# Patient Record
Sex: FEMALE | Race: WHITE | NOT HISPANIC OR LATINO | Employment: OTHER | URBAN - METROPOLITAN AREA
[De-identification: names, ages, dates, MRNs, and addresses within clinical notes are randomized per-mention and may not be internally consistent; named-entity substitution may affect disease eponyms.]

---

## 2017-04-24 ENCOUNTER — HISTORICAL (OUTPATIENT)
Dept: ADMINISTRATIVE | Facility: HOSPITAL | Age: 67
End: 2017-04-24

## 2019-09-12 ENCOUNTER — OFFICE VISIT (OUTPATIENT)
Dept: FAMILY MEDICINE | Facility: CLINIC | Age: 69
End: 2019-09-12
Payer: MEDICARE

## 2019-09-12 VITALS
BODY MASS INDEX: 26 KG/M2 | HEIGHT: 59 IN | WEIGHT: 129 LBS | HEART RATE: 64 BPM | DIASTOLIC BLOOD PRESSURE: 80 MMHG | SYSTOLIC BLOOD PRESSURE: 152 MMHG

## 2019-09-12 DIAGNOSIS — M71.22 SYNOVIAL CYST OF LEFT POPLITEAL SPACE: ICD-10-CM

## 2019-09-12 DIAGNOSIS — I10 ESSENTIAL HYPERTENSION: Primary | ICD-10-CM

## 2019-09-12 PROCEDURE — 99214 PR OFFICE/OUTPT VISIT, EST, LEVL IV, 30-39 MIN: ICD-10-PCS | Mod: S$GLB,,, | Performed by: PHYSICIAN ASSISTANT

## 2019-09-12 PROCEDURE — 99214 OFFICE O/P EST MOD 30 MIN: CPT | Mod: S$GLB,,, | Performed by: PHYSICIAN ASSISTANT

## 2019-09-12 RX ORDER — LISINOPRIL 20 MG/1
TABLET ORAL
COMMUNITY
End: 2019-09-12 | Stop reason: SDUPTHER

## 2019-09-12 RX ORDER — LISINOPRIL 20 MG/1
20 TABLET ORAL DAILY
Qty: 90 TABLET | Refills: 1 | Status: SHIPPED | OUTPATIENT
Start: 2019-09-12 | End: 2020-03-12 | Stop reason: SDUPTHER

## 2019-09-12 NOTE — PATIENT INSTRUCTIONS
Treatment for Bakers Cyst (Popliteal Cyst)  A Bakers cyst (popliteal cyst) is a fluid-filled sac that forms behind the knee.  Types of treatment  You likely wont need any treatment if you dont have any symptoms from your Bakers cyst. Some Bakers cysts go away without any treatment. If your cyst starts causing symptoms, you might need treatment at that time.  If you do have symptoms, you may be treated depending on the cause of your cyst. For example, you may need medicine for rheumatoid arthritis. Or you may need physical therapy for osteoarthritis.  Other treatments for a Bakers cyst can include:  · Over-the-counter pain medicines  · Arthrocentesis to remove extra fluid from the joint space  · Steroid injection into the joint to reduce cyst size  · Surgery to remove the cyst  Possible complications of a Bakers cyst  In rare cases, a Bakers cyst may cause complications. The cyst may get larger, which may cause redness and swelling. The cyst may also rupture, causing warmth, redness, and pain in your calf.  The symptoms may be the same as a blood clot in the veins of the legs. Your health care provider may need imaging tests of your leg to make sure you dont have a clot. Rupture can also lead to its own complications, such as:  · Trapping of a tibial nerve. This cases calf pain and numbness behind the leg. It can be treated with arthrocentesis and steroid injections.  · Blockage of the popliteal artery. This causes pain and lack of blood flow to the leg. It can also be treated with arthrocentesis and steroid injections.  · Compartment syndrome. This causes intense pain and problems moving the foot or toes. Compartment syndrome is a medical emergency. It needs immediate surgery. It can lead to permanent muscle damage if not treated right away.  When to call the health care provider  If your cyst starts causing mild symptoms, plan to see your health care provider soon. See him or her right away if you have  symptoms such as redness and swelling of your leg. These symptoms may mean your Bakers cyst has ruptured.      Date Last Reviewed: 7/21/2015  © 7819-7394 [employer]. [address] [place] [address], [place] [address]. All rights reserved. This information is not intended as a substitute for professional medical care. Always follow your healthcare professional's instructions.         Detail Level: Simple Comment: S/p ED&C

## 2019-09-12 NOTE — PROGRESS NOTES
SUBJECTIVE:    Patient ID: Lina Ventura is a 68 y.o. female.    Chief Complaint: Annual Exam    69 yo wf presents for regular checkup. She reports that she has continued to do pretty well. Only treated for the HTN.. She reports that her legs have gotten to be troublesome for her. Thighs, knees, joints. Does have problems with baker's cyst on the LT knee. Was bigger a few weeks back after being on her feet a great deal.      No visits with results within 6 Month(s) from this visit.   Latest known visit with results is:   No results found for any previous visit.       Past Medical History:   Diagnosis Date    Cholelithiasis      Past Surgical History:   Procedure Laterality Date    CHOLECYSTECTOMY  04/24/2017    Laparoscopic      Family History   Problem Relation Age of Onset    Breast cancer Maternal Aunt         In her 30's       Marital Status: Single  Alcohol History:  reports that she does not drink alcohol.  Tobacco History:  reports that she has never smoked. She has never used smokeless tobacco.  Drug History:  reports that she does not use drugs.    Review of patient's allergies indicates:   Allergen Reactions    Bactrim [sulfamethoxazole-trimethoprim]        Current Outpatient Medications:     cholecalciferol, vitamin D3, 4,000 unit Cap, Vitamin D3 4,000 unit capsule  Take 1 capsule every day by oral route., Disp: , Rfl:     lisinopril (PRINIVIL,ZESTRIL) 20 MG tablet, Take 1 tablet (20 mg total) by mouth once daily., Disp: 90 tablet, Rfl: 1    Review of Systems   Constitutional: Negative for appetite change, chills, fatigue, fever and unexpected weight change.   HENT: Negative for congestion.    Respiratory: Negative for cough, chest tightness and shortness of breath.    Cardiovascular: Negative for chest pain and palpitations.   Gastrointestinal: Negative for abdominal distention and abdominal pain.   Endocrine: Negative for cold intolerance and heat intolerance.   Genitourinary: Negative for  "difficulty urinating and dysuria.   Musculoskeletal: Negative for arthralgias and back pain.   Neurological: Negative for dizziness, weakness and headaches.          Objective:      Vitals:    09/12/19 1008   BP: (!) 152/80   Pulse: 64   Weight: 58.5 kg (129 lb)   Height: 4' 11" (1.499 m)     Physical Exam   Constitutional: She is oriented to person, place, and time. She appears well-developed and well-nourished. No distress.   HENT:   Head: Normocephalic and atraumatic.   Eyes: Pupils are equal, round, and reactive to light. Conjunctivae and EOM are normal.   Neck: Normal range of motion. Neck supple. No thyromegaly present.   Cardiovascular: Normal rate, regular rhythm, normal heart sounds and intact distal pulses.   Pulmonary/Chest: Effort normal and breath sounds normal.   Abdominal: Soft. Bowel sounds are normal. She exhibits no distension. There is no tenderness.   Musculoskeletal: Normal range of motion.        Left lower leg: She exhibits swelling.   Neurological: She is alert and oriented to person, place, and time. No cranial nerve deficit.   Skin: Skin is warm and dry. No erythema.   Psychiatric: She has a normal mood and affect.         Assessment:       1. Essential hypertension    2. Synovial cyst of left popliteal space         Plan:       Essential hypertension  Comments:  well managed. continue as is  Orders:  -     lisinopril (PRINIVIL,ZESTRIL) 20 MG tablet; Take 1 tablet (20 mg total) by mouth once daily.  Dispense: 90 tablet; Refill: 1    Synovial cyst of left popliteal space  Comments:  I do appreciated pretty good baker cyst. Will refer to Dr. Gonzalez since it is bothering her.  Orders:  -     Ambulatory referral/consult to Orthopedics; Future; Expected date: 09/12/2019      Follow up in about 6 months (around 3/12/2020), or if symptoms worsen or fail to improve.        9/12/2019 Tylor Brewer PA-C    "

## 2019-10-08 ENCOUNTER — OFFICE VISIT (OUTPATIENT)
Dept: ORTHOPEDICS | Facility: CLINIC | Age: 69
End: 2019-10-08
Payer: MEDICARE

## 2019-10-08 VITALS
DIASTOLIC BLOOD PRESSURE: 80 MMHG | WEIGHT: 129 LBS | BODY MASS INDEX: 26 KG/M2 | SYSTOLIC BLOOD PRESSURE: 138 MMHG | HEART RATE: 64 BPM | HEIGHT: 59 IN

## 2019-10-08 DIAGNOSIS — M17.11 PATELLOFEMORAL ARTHRITIS OF RIGHT KNEE: ICD-10-CM

## 2019-10-08 DIAGNOSIS — M71.22 SYNOVIAL CYST OF LEFT POPLITEAL SPACE: ICD-10-CM

## 2019-10-08 DIAGNOSIS — M17.12 PATELLOFEMORAL ARTHRITIS OF LEFT KNEE: Primary | ICD-10-CM

## 2019-10-08 PROCEDURE — 99203 PR OFFICE/OUTPT VISIT, NEW, LEVL III, 30-44 MIN: ICD-10-PCS | Mod: 25,S$GLB,, | Performed by: ORTHOPAEDIC SURGERY

## 2019-10-08 PROCEDURE — 20610 LARGE JOINT ASPIRATION/INJECTION: R KNEE: ICD-10-PCS | Mod: 50,S$GLB,, | Performed by: ORTHOPAEDIC SURGERY

## 2019-10-08 PROCEDURE — 20610 DRAIN/INJ JOINT/BURSA W/O US: CPT | Mod: 50,S$GLB,, | Performed by: ORTHOPAEDIC SURGERY

## 2019-10-08 PROCEDURE — 99203 OFFICE O/P NEW LOW 30 MIN: CPT | Mod: 25,S$GLB,, | Performed by: ORTHOPAEDIC SURGERY

## 2019-10-08 RX ORDER — METHYLPREDNISOLONE ACETATE 40 MG/ML
40 INJECTION, SUSPENSION INTRA-ARTICULAR; INTRALESIONAL; INTRAMUSCULAR; SOFT TISSUE
Status: DISCONTINUED | OUTPATIENT
Start: 2019-10-08 | End: 2019-10-08 | Stop reason: HOSPADM

## 2019-10-08 RX ADMIN — METHYLPREDNISOLONE ACETATE 40 MG: 40 INJECTION, SUSPENSION INTRA-ARTICULAR; INTRALESIONAL; INTRAMUSCULAR; SOFT TISSUE at 09:10

## 2019-10-08 NOTE — LETTER
October 8, 2019      Tylor Brewer PA-C  [address]  [address]  [facility] [address]           [facility]  [address]  [facility] [address]  Phone: [phone]  Fax: [phone]          Patient: Lina Ventura   MR Number: [id number]   YOB: 1950   Date of Visit: 10/8/2019       Dear Tylor Brewer:    Thank you for referring Lina Ventura to me for evaluation. Attached you will find relevant portions of my assessment and plan of care.    If you have questions, please do not hesitate to call me. I look forward to following Lina Ventura along with you.    Sincerely,    Mustapha Gonzalez MD    Enclosure  CC:  No Recipients    If you would like to receive this communication electronically, please contact [email or web address] or [phone] to request more information on [employer] access.    For providers and/or their staff who would like to refer a patient to [facility], please contact us through our one-stop-shop provider referral line, [facility] at [phone].    If you feel you have received this communication in error or would no longer like to receive these types of communications, please e-mail [email or web address]

## 2019-10-08 NOTE — PROCEDURES
Large Joint Aspiration/Injection: R knee  Date/Time: 10/8/2019 9:00 AM  Performed by: Mustapha Gonzalez MD  Authorized by: Mustapha Gonzalez MD     Consent Done?:  Yes (Verbal)  Indications:  Pain  Procedure site marked: Yes    Timeout: Prior to procedure the correct patient, procedure, and site was verified    Anesthesia  Local anesthesia used  Anesthesia: local infiltration  Anesthetic: lidocaine 1% without epinephrine    Location:  Knee  Site:  R knee  Prep: Patient was prepped and draped in usual sterile fashion    Needle size:  25 G  Medications:  40 mg methylPREDNISolone acetate 40 mg/mL; 40 mg methylPREDNISolone acetate 40 mg/mL  Patient tolerance:  Patient tolerated the procedure well with no immediate complications  Large Joint Aspiration/Injection: L knee  Date/Time: 10/8/2019 9:00 AM  Performed by: Mustapha Gonzalez MD  Authorized by: Mustapha Gonzalez MD     Consent Done?:  Yes (Verbal)  Indications:  Pain  Procedure site marked: Yes    Timeout: Prior to procedure the correct patient, procedure, and site was verified    Anesthesia  Local anesthesia used  Anesthesia: local infiltration  Anesthetic: lidocaine 1% without epinephrine    Location:  Knee  Site:  L knee  Prep: Patient was prepped and draped in usual sterile fashion    Needle size:  25 G  Medications:  40 mg methylPREDNISolone acetate 40 mg/mL; 40 mg methylPREDNISolone acetate 40 mg/mL  Patient tolerance:  Patient tolerated the procedure well with no immediate complications

## 2019-10-08 NOTE — PROGRESS NOTES
[facility]    Subjective:     Chief Complaint:   Chief Complaint   Patient presents with    Left Knee - Pain     Left knee pain x a while. States that the pain has gotten worse since last December. States that she did have issues with bearing weight, that got better some. Pain is constant and gets worse with activity.     Right Knee - Pain     Right knee pain x a while. States that her knee pain had gotten worse in December. States that she did have issues with bearing weight, but that is better. She does have constant pain that gets worse with activity.        Past Medical History:   Diagnosis Date    Cholelithiasis        Past Surgical History:   Procedure Laterality Date    CHOLECYSTECTOMY  04/24/2017    Laparoscopic        Current Outpatient Medications   Medication Sig    cholecalciferol, vitamin D3, 4,000 unit Cap Vitamin D3 4,000 unit capsule   Take 1 capsule every day by oral route.    lisinopril (PRINIVIL,ZESTRIL) 20 MG tablet Take 1 tablet (20 mg total) by mouth once daily.    potassium 99 mg Tab Take by mouth once.     No current facility-administered medications for this visit.        Review of patient's allergies indicates:   Allergen Reactions    Bactrim [sulfamethoxazole-trimethoprim] Palpitations and Other (See Comments)     Chest pain       Family History   Problem Relation Age of Onset    Breast cancer Maternal Aunt         In her 30's       Social History     Socioeconomic History    Marital status: Single     Spouse name: Not on file    Number of children: Not on file    Years of education: Not on file    Highest education level: Not on file   Occupational History    Not on file   Social Needs    Financial resource strain: Not on file    Food insecurity:     Worry: Not on file     Inability: Not on file    Transportation needs:     Medical: Not on file     Non-medical: Not on file   Tobacco Use    Smoking status: Never Smoker    Smokeless tobacco: Never Used    Substance and Sexual Activity    Alcohol use: No    Drug use: No    Sexual activity: Not on file   Lifestyle    Physical activity:     Days per week: Not on file     Minutes per session: Not on file    Stress: Not at all   Relationships    Social connections:     Talks on phone: Not on file     Gets together: Not on file     Attends Quaker service: Not on file     Active member of club or organization: Not on file     Attends meetings of clubs or organizations: Not on file     Relationship status: Not on file   Other Topics Concern    Not on file   Social History Narrative    Not on file       History of present illness: Patient comes in today for the bilateral knees. She has anterior knee pain bilaterally. This been going on for about 6 months. She is pain going down stairs. Pain getting up from a chair. Denies any real trauma.      Review of Systems:    Constitution: Negative for chills, fever, and sweats.  Negative for unexplained weight loss.    HENT:  Negative for headaches and blurry vision.    Cardiovascular:Negative for chest pain or irregular heart beat. Negative for hypertension.    Respiratory:  Negative for cough and shortness of breath.    Gastrointestinal: Negative for abdominal pain, heartburn, melena, nausea, and vomitting.    Genitourinary:  Negative bladder incontinence and dysuria.    Musculoskeletal:  See HPI for details.     Neurological: Negative for numbness.    Psychiatric/Behavioral: Negative for depression.  The patient is not nervous/anxious.      Endocrine: Negative for polyuria    Hematologic/Lymphatic: Negative for bleeding problem.  Does not bruise/bleed easily.    Skin: Negative for poor would healing and rash    Objective:      Physical Examination:    Vital Signs:    Vitals:    10/08/19 0906   BP: 138/80   Pulse: 64       Body mass index is 26.05 kg/m².    This a well-developed, well nourished patient in no acute distress.  They are alert and oriented and cooperative to  examination.        Patient has bilateral anterior crepitus. She has 1+ effusion in the left not on the right. Her knees are stable to varus valgus anterior posterior stresses. Negative straight leg raises. EHLs are intact deep tendon reflexes are intact. Homans sign is negative bilaterally  Pertinent New Results:    XRAY Report / Interpretation:   AP lateral and sunrise views of her knees demonstrate mild patellofemoral arthrosis bilaterally    Assessment/Plan:      Chondromalacia patella bilaterally. I injected both knees with Depo-Medrol and lidocaine. Start physical therapy. Follow-up in 2 months      This note was created using Dragon voice recognition software that occasionally misinterpreted phrases or words.

## 2019-12-18 ENCOUNTER — TELEPHONE (OUTPATIENT)
Dept: FAMILY MEDICINE | Facility: CLINIC | Age: 69
End: 2019-12-18

## 2019-12-18 NOTE — TELEPHONE ENCOUNTER
----- Message from Tylor Brewer PA-C sent at 12/18/2019 12:10 PM CST -----  No evidence of malignancy on mammogram. We will recheck again in one year.

## 2019-12-18 NOTE — TELEPHONE ENCOUNTER
Called cell, voicemail not set up. Called home, LM with gentlemen who answered the phone to have her call me.

## 2020-01-07 ENCOUNTER — OFFICE VISIT (OUTPATIENT)
Dept: ORTHOPEDICS | Facility: CLINIC | Age: 70
End: 2020-01-07
Payer: MEDICARE

## 2020-01-07 VITALS — DIASTOLIC BLOOD PRESSURE: 84 MMHG | WEIGHT: 130 LBS | BODY MASS INDEX: 26.26 KG/M2 | SYSTOLIC BLOOD PRESSURE: 128 MMHG

## 2020-01-07 DIAGNOSIS — M17.12 PATELLOFEMORAL ARTHRITIS OF LEFT KNEE: Primary | ICD-10-CM

## 2020-01-07 DIAGNOSIS — M17.11 PATELLOFEMORAL ARTHRITIS OF RIGHT KNEE: ICD-10-CM

## 2020-01-07 PROCEDURE — 1159F PR MEDICATION LIST DOCUMENTED IN MEDICAL RECORD: ICD-10-PCS | Mod: S$GLB,,, | Performed by: ORTHOPAEDIC SURGERY

## 2020-01-07 PROCEDURE — 1159F MED LIST DOCD IN RCRD: CPT | Mod: S$GLB,,, | Performed by: ORTHOPAEDIC SURGERY

## 2020-01-07 PROCEDURE — 1125F AMNT PAIN NOTED PAIN PRSNT: CPT | Mod: S$GLB,,, | Performed by: ORTHOPAEDIC SURGERY

## 2020-01-07 PROCEDURE — 1125F PR PAIN SEVERITY QUANTIFIED, PAIN PRESENT: ICD-10-PCS | Mod: S$GLB,,, | Performed by: ORTHOPAEDIC SURGERY

## 2020-01-07 PROCEDURE — 99213 OFFICE O/P EST LOW 20 MIN: CPT | Mod: S$GLB,,, | Performed by: ORTHOPAEDIC SURGERY

## 2020-01-07 PROCEDURE — 99213 PR OFFICE/OUTPT VISIT, EST, LEVL III, 20-29 MIN: ICD-10-PCS | Mod: S$GLB,,, | Performed by: ORTHOPAEDIC SURGERY

## 2020-01-07 NOTE — PROGRESS NOTES
[facility]    Subjective:     Chief Complaint:   Chief Complaint   Patient presents with    Left Knee - Pain     Bilateral knee injections 10-8. Injections helped. She also went to P.T and that also helped. She still has some pain.     Right Knee - Pain       Past Medical History:   Diagnosis Date    Cholelithiasis        Past Surgical History:   Procedure Laterality Date    CHOLECYSTECTOMY  04/24/2017    Laparoscopic        Current Outpatient Medications   Medication Sig    cholecalciferol, vitamin D3, 4,000 unit Cap Vitamin D3 4,000 unit capsule   Take 1 capsule every day by oral route.    lisinopril (PRINIVIL,ZESTRIL) 20 MG tablet Take 1 tablet (20 mg total) by mouth once daily.    potassium 99 mg Tab Take by mouth once.     No current facility-administered medications for this visit.        Review of patient's allergies indicates:   Allergen Reactions    Bactrim [sulfamethoxazole-trimethoprim] Palpitations and Other (See Comments)     Chest pain       Family History   Problem Relation Age of Onset    Breast cancer Maternal Aunt         In her 30's       Social History     Socioeconomic History    Marital status: Single     Spouse name: Not on file    Number of children: Not on file    Years of education: Not on file    Highest education level: Not on file   Occupational History    Not on file   Social Needs    Financial resource strain: Not on file    Food insecurity:     Worry: Not on file     Inability: Not on file    Transportation needs:     Medical: Not on file     Non-medical: Not on file   Tobacco Use    Smoking status: Never Smoker    Smokeless tobacco: Never Used   Substance and Sexual Activity    Alcohol use: No    Drug use: No    Sexual activity: Not on file   Lifestyle    Physical activity:     Days per week: Not on file     Minutes per session: Not on file    Stress: Not at all   Relationships    Social connections:     Talks on phone: Not on file     Gets  together: Not on file     Attends Bahai service: Not on file     Active member of club or organization: Not on file     Attends meetings of clubs or organizations: Not on file     Relationship status: Not on file   Other Topics Concern    Not on file   Social History Narrative    Not on file       History of present illness:  Patient comes in today for her bilateral knees.  She is doing better on the Depo-Medrol injections.  Her pain is improved.      Review of Systems:    Constitution: Negative for chills, fever, and sweats.  Negative for unexplained weight loss.    HENT:  Negative for headaches and blurry vision.    Cardiovascular:Negative for chest pain or irregular heart beat. Negative for hypertension.    Respiratory:  Negative for cough and shortness of breath.    Gastrointestinal: Negative for abdominal pain, heartburn, melena, nausea, and vomitting.    Genitourinary:  Negative bladder incontinence and dysuria.    Musculoskeletal:  See HPI for details.     Neurological: Negative for numbness.    Psychiatric/Behavioral: Negative for depression.  The patient is not nervous/anxious.      Endocrine: Negative for polyuria    Hematologic/Lymphatic: Negative for bleeding problem.  Does not bruise/bleed easily.    Skin: Negative for poor would healing and rash    Objective:      Physical Examination:    Vital Signs:    Vitals:    01/07/20 0725   BP: 128/84       Body mass index is 26.26 kg/m².    This a well-developed, well nourished patient in no acute distress.  They are alert and oriented and cooperative to examination.        Range of motion is 0-130 degrees bilaterally.  Neurovascularly intact to sensory and motor testing in the lower extremities.  Pertinent New Results:    XRAY Report / Interpretation:       Assessment/Plan:      Moderate osteoarthritis bilateral knees.  Doing very well.  Follow up p.r.n.      This note was created using Dragon voice recognition software that occasionally misinterpreted  phrases or words.

## 2020-03-12 ENCOUNTER — OFFICE VISIT (OUTPATIENT)
Dept: FAMILY MEDICINE | Facility: CLINIC | Age: 70
End: 2020-03-12
Payer: MEDICARE

## 2020-03-12 VITALS
BODY MASS INDEX: 26.41 KG/M2 | HEART RATE: 64 BPM | DIASTOLIC BLOOD PRESSURE: 88 MMHG | HEIGHT: 59 IN | WEIGHT: 131 LBS | SYSTOLIC BLOOD PRESSURE: 136 MMHG

## 2020-03-12 DIAGNOSIS — K21.9 GASTROESOPHAGEAL REFLUX DISEASE, ESOPHAGITIS PRESENCE NOT SPECIFIED: Primary | ICD-10-CM

## 2020-03-12 DIAGNOSIS — I10 ESSENTIAL HYPERTENSION: ICD-10-CM

## 2020-03-12 PROCEDURE — 99214 PR OFFICE/OUTPT VISIT, EST, LEVL IV, 30-39 MIN: ICD-10-PCS | Mod: S$GLB,,, | Performed by: PHYSICIAN ASSISTANT

## 2020-03-12 PROCEDURE — 99214 OFFICE O/P EST MOD 30 MIN: CPT | Mod: S$GLB,,, | Performed by: PHYSICIAN ASSISTANT

## 2020-03-12 RX ORDER — LISINOPRIL 20 MG/1
20 TABLET ORAL DAILY
Qty: 90 TABLET | Refills: 1 | Status: SHIPPED | OUTPATIENT
Start: 2020-03-12 | End: 2021-06-01 | Stop reason: SDUPTHER

## 2020-03-12 NOTE — PATIENT INSTRUCTIONS

## 2020-03-12 NOTE — PROGRESS NOTES
SUBJECTIVE:    Patient ID: Lina Ventura is a 69 y.o. female.    Chief Complaint: Follow-up (6 month//no bottles tb) and Abdominal Pain    69-year-old female presents for follow-up.  She is just treated for hypertension and well controlled with lisinopril 20 mg. She reports that she has been doing pretty well with regard to the Knees. Ended up seeing Dr. Gonzalez for injection and PT. Feels like her sxs have improved. She does report that she has been having some persistent epigastric abdominal pain. Says that sxs are worse after she eats and then a lot of times after she goes to bed. Burning, heartburn related. Belches a good bit. Tries to avoid certain types of foods. She was referred to GI but never followed up and had EGD done for evaluation. Had gallbladder taken out in 2017. Dr. Jimenez      No visits with results within 6 Month(s) from this visit.   Latest known visit with results is:   No results found for any previous visit.       Past Medical History:   Diagnosis Date    Cholelithiasis      Past Surgical History:   Procedure Laterality Date    CHOLECYSTECTOMY  04/24/2017    Laparoscopic      Family History   Problem Relation Age of Onset    Breast cancer Maternal Aunt         In her 30's       Marital Status: Single  Alcohol History:  reports that she does not drink alcohol.  Tobacco History:  reports that she has never smoked. She has never used smokeless tobacco.  Drug History:  reports that she does not use drugs.    Review of patient's allergies indicates:   Allergen Reactions    Bactrim [sulfamethoxazole-trimethoprim] Palpitations and Other (See Comments)     Chest pain       Current Outpatient Medications:     cholecalciferol, vitamin D3, 4,000 unit Cap, Vitamin D3 4,000 unit capsule  Take 1 capsule every day by oral route., Disp: , Rfl:     lisinopriL (PRINIVIL,ZESTRIL) 20 MG tablet, Take 1 tablet (20 mg total) by mouth once daily., Disp: 90 tablet, Rfl: 1    potassium 99 mg Tab, Take by mouth  "once., Disp: , Rfl:     Review of Systems   Constitutional: Negative for fever.   Gastrointestinal: Positive for abdominal pain. Negative for diarrhea, nausea and vomiting.          Objective:      Vitals:    03/12/20 0825   BP: 136/88   Pulse: 64   Weight: 59.4 kg (131 lb)   Height: 4' 11" (1.499 m)     Physical Exam   Constitutional: She is oriented to person, place, and time. She appears well-developed and well-nourished. No distress.   HENT:   Head: Normocephalic and atraumatic.   Eyes: Pupils are equal, round, and reactive to light. Conjunctivae and EOM are normal.   Neck: Normal range of motion. Neck supple. No thyromegaly present.   Cardiovascular: Normal rate, regular rhythm, normal heart sounds and intact distal pulses.   Pulmonary/Chest: Effort normal and breath sounds normal.   Abdominal: Soft. Bowel sounds are normal. She exhibits no distension. There is no tenderness.   Musculoskeletal: Normal range of motion.   Neurological: She is alert and oriented to person, place, and time. No cranial nerve deficit.   Skin: Skin is warm and dry. No erythema.   Psychiatric: She has a normal mood and affect.         Assessment:       1. Gastroesophageal reflux disease, esophagitis presence not specified    2. Essential hypertension         Plan:       Gastroesophageal reflux disease, esophagitis presence not specified  Comments:  Rather severe symptoms and worsening.  Suspect bad reflux esophagitis.  Will check blood work today and refer patient for GI evaluation to include EGD.  Orders:  -     CBC auto differential; Future; Expected date: 03/12/2020  -     Comprehensive metabolic panel; Future; Expected date: 03/12/2020  -     Helicobacter Pylori Urea Breath Test  -     Amylase; Future; Expected date: 03/12/2020  -     Lipase; Future; Expected date: 03/12/2020  -     Ambulatory referral/consult to Gastroenterology; Future; Expected date: 03/19/2020    Essential hypertension  Comments:  well managed. continue as " is  Orders:  -     lisinopriL (PRINIVIL,ZESTRIL) 20 MG tablet; Take 1 tablet (20 mg total) by mouth once daily.  Dispense: 90 tablet; Refill: 1      Follow up in about 8 weeks (around 5/7/2020).        3/12/2020 Tylor Brewer PA-C

## 2020-03-13 LAB
ALBUMIN SERPL-MCNC: 3.9 G/DL (ref 3.6–5.1)
ALBUMIN/GLOB SERPL: 1.1 (CALC) (ref 1–2.5)
ALP SERPL-CCNC: 74 U/L (ref 37–153)
ALT SERPL-CCNC: 14 U/L (ref 6–29)
AMYLASE SERPL-CCNC: 86 U/L (ref 21–101)
AST SERPL-CCNC: 16 U/L (ref 10–35)
BASOPHILS # BLD AUTO: 29 CELLS/UL (ref 0–200)
BASOPHILS NFR BLD AUTO: 0.7 %
BILIRUB SERPL-MCNC: 0.6 MG/DL (ref 0.2–1.2)
BUN SERPL-MCNC: 22 MG/DL (ref 7–25)
BUN/CREAT SERPL: ABNORMAL (CALC) (ref 6–22)
CALCIUM SERPL-MCNC: 9.3 MG/DL (ref 8.6–10.4)
CHLORIDE SERPL-SCNC: 101 MMOL/L (ref 98–110)
CO2 SERPL-SCNC: 28 MMOL/L (ref 20–32)
CREAT SERPL-MCNC: 0.81 MG/DL (ref 0.6–0.93)
EOSINOPHIL # BLD AUTO: 172 CELLS/UL (ref 15–500)
EOSINOPHIL NFR BLD AUTO: 4.1 %
ERYTHROCYTE [DISTWIDTH] IN BLOOD BY AUTOMATED COUNT: 14 % (ref 11–15)
GFRSERPLBLD MDRD-ARVRAT: 74 ML/MIN/1.73M2
GLOBULIN SER CALC-MCNC: 3.4 G/DL (CALC) (ref 1.9–3.7)
GLUCOSE SERPL-MCNC: 103 MG/DL (ref 65–99)
HCT VFR BLD AUTO: 44.8 % (ref 35–45)
HGB BLD-MCNC: 14.1 G/DL (ref 11.7–15.5)
LIPASE SERPL-CCNC: 52 U/L (ref 7–60)
LYMPHOCYTES # BLD AUTO: 1835 CELLS/UL (ref 850–3900)
LYMPHOCYTES NFR BLD AUTO: 43.7 %
MCH RBC QN AUTO: 27.6 PG (ref 27–33)
MCHC RBC AUTO-ENTMCNC: 31.5 G/DL (ref 32–36)
MCV RBC AUTO: 87.8 FL (ref 80–100)
MONOCYTES # BLD AUTO: 391 CELLS/UL (ref 200–950)
MONOCYTES NFR BLD AUTO: 9.3 %
NEUTROPHILS # BLD AUTO: 1772 CELLS/UL (ref 1500–7800)
NEUTROPHILS NFR BLD AUTO: 42.2 %
PLATELET # BLD AUTO: 236 THOUSAND/UL (ref 140–400)
PMV BLD REES-ECKER: 11.4 FL (ref 7.5–12.5)
POTASSIUM SERPL-SCNC: 4.6 MMOL/L (ref 3.5–5.3)
PROT SERPL-MCNC: 7.3 G/DL (ref 6.1–8.1)
RBC # BLD AUTO: 5.1 MILLION/UL (ref 3.8–5.1)
SODIUM SERPL-SCNC: 138 MMOL/L (ref 135–146)
WBC # BLD AUTO: 4.2 THOUSAND/UL (ref 3.8–10.8)

## 2020-03-17 ENCOUNTER — TELEPHONE (OUTPATIENT)
Dept: FAMILY MEDICINE | Facility: CLINIC | Age: 70
End: 2020-03-17

## 2020-03-17 NOTE — TELEPHONE ENCOUNTER
Called cell, no voicemail. Called home and spoke to son, Alexander, who will tell her to call Jennifer AREVALO

## 2020-03-17 NOTE — TELEPHONE ENCOUNTER
----- Message from Tylor Brewer PA-C sent at 3/15/2020  6:46 PM CDT -----  Labs all look stable at this time. Continue as is. Call me with any further questions.

## 2020-03-18 NOTE — PROGRESS NOTES
Jennifer Black MA         8:53 AM   Note       Pt returned call and was notified that all labs were stable.  Pt verbalized understanding.

## 2021-05-19 ENCOUNTER — TELEPHONE (OUTPATIENT)
Dept: FAMILY MEDICINE | Facility: CLINIC | Age: 71
End: 2021-05-19

## 2021-05-19 DIAGNOSIS — Z00.00 ROUTINE GENERAL MEDICAL EXAMINATION AT A HEALTH CARE FACILITY: ICD-10-CM

## 2021-05-19 DIAGNOSIS — Z79.899 ENCOUNTER FOR LONG-TERM (CURRENT) USE OF OTHER MEDICATIONS: Primary | ICD-10-CM

## 2021-06-01 ENCOUNTER — OFFICE VISIT (OUTPATIENT)
Dept: FAMILY MEDICINE | Facility: CLINIC | Age: 71
End: 2021-06-01
Payer: MEDICARE

## 2021-06-01 VITALS
WEIGHT: 128 LBS | OXYGEN SATURATION: 98 % | HEART RATE: 76 BPM | BODY MASS INDEX: 25.85 KG/M2 | DIASTOLIC BLOOD PRESSURE: 102 MMHG | SYSTOLIC BLOOD PRESSURE: 155 MMHG

## 2021-06-01 DIAGNOSIS — I10 ESSENTIAL HYPERTENSION: ICD-10-CM

## 2021-06-01 PROCEDURE — 99213 PR OFFICE/OUTPT VISIT, EST, LEVL III, 20-29 MIN: ICD-10-PCS | Mod: S$GLB,,, | Performed by: PHYSICIAN ASSISTANT

## 2021-06-01 PROCEDURE — 99213 OFFICE O/P EST LOW 20 MIN: CPT | Mod: S$GLB,,, | Performed by: PHYSICIAN ASSISTANT

## 2021-06-01 RX ORDER — LISINOPRIL 20 MG/1
20 TABLET ORAL DAILY
Qty: 90 TABLET | Refills: 1 | Status: SHIPPED | OUTPATIENT
Start: 2021-06-01 | End: 2021-07-06 | Stop reason: SDUPTHER

## 2021-06-01 RX ORDER — PANTOPRAZOLE SODIUM 40 MG/1
40 TABLET, DELAYED RELEASE ORAL DAILY PRN
COMMUNITY
Start: 2021-01-09

## 2021-06-02 ENCOUNTER — TELEPHONE (OUTPATIENT)
Dept: FAMILY MEDICINE | Facility: CLINIC | Age: 71
End: 2021-06-02

## 2021-06-02 LAB
ALBUMIN SERPL-MCNC: 4 G/DL (ref 3.6–5.1)
ALBUMIN/CREAT UR: 2 MCG/MG CREAT
ALBUMIN/GLOB SERPL: 1.2 (CALC) (ref 1–2.5)
ALP SERPL-CCNC: 86 U/L (ref 37–153)
ALT SERPL-CCNC: 12 U/L (ref 6–29)
APPEARANCE UR: CLEAR
AST SERPL-CCNC: 16 U/L (ref 10–35)
BACTERIA #/AREA URNS HPF: ABNORMAL /HPF
BACTERIA UR CULT: ABNORMAL
BASOPHILS # BLD AUTO: 42 CELLS/UL (ref 0–200)
BASOPHILS NFR BLD AUTO: 1 %
BILIRUB SERPL-MCNC: 0.7 MG/DL (ref 0.2–1.2)
BILIRUB UR QL STRIP: NEGATIVE
BUN SERPL-MCNC: 18 MG/DL (ref 7–25)
BUN/CREAT SERPL: NORMAL (CALC) (ref 6–22)
CALCIUM SERPL-MCNC: 9.1 MG/DL (ref 8.6–10.4)
CHLORIDE SERPL-SCNC: 103 MMOL/L (ref 98–110)
CHOLEST SERPL-MCNC: 222 MG/DL
CHOLEST/HDLC SERPL: 3.1 (CALC)
CO2 SERPL-SCNC: 24 MMOL/L (ref 20–32)
COLOR UR: YELLOW
CREAT SERPL-MCNC: 0.76 MG/DL (ref 0.6–0.93)
CREAT UR-MCNC: 86 MG/DL (ref 20–275)
EOSINOPHIL # BLD AUTO: 260 CELLS/UL (ref 15–500)
EOSINOPHIL NFR BLD AUTO: 6.2 %
ERYTHROCYTE [DISTWIDTH] IN BLOOD BY AUTOMATED COUNT: 14.3 % (ref 11–15)
GLOBULIN SER CALC-MCNC: 3.4 G/DL (CALC) (ref 1.9–3.7)
GLUCOSE SERPL-MCNC: 98 MG/DL (ref 65–99)
GLUCOSE UR QL STRIP: NEGATIVE
HCT VFR BLD AUTO: 43.7 % (ref 35–45)
HDLC SERPL-MCNC: 71 MG/DL
HGB BLD-MCNC: 14.1 G/DL (ref 11.7–15.5)
HGB UR QL STRIP: ABNORMAL
HYALINE CASTS #/AREA URNS LPF: ABNORMAL /LPF
KETONES UR QL STRIP: NEGATIVE
LDLC SERPL CALC-MCNC: 131 MG/DL (CALC)
LEUKOCYTE ESTERASE UR QL STRIP: NEGATIVE
LYMPHOCYTES # BLD AUTO: 1793 CELLS/UL (ref 850–3900)
LYMPHOCYTES NFR BLD AUTO: 42.7 %
MCH RBC QN AUTO: 27.9 PG (ref 27–33)
MCHC RBC AUTO-ENTMCNC: 32.3 G/DL (ref 32–36)
MCV RBC AUTO: 86.4 FL (ref 80–100)
MICROALBUMIN UR-MCNC: 0.2 MG/DL
MONOCYTES # BLD AUTO: 420 CELLS/UL (ref 200–950)
MONOCYTES NFR BLD AUTO: 10 %
NEUTROPHILS # BLD AUTO: 1684 CELLS/UL (ref 1500–7800)
NEUTROPHILS NFR BLD AUTO: 40.1 %
NITRITE UR QL STRIP: NEGATIVE
NONHDLC SERPL-MCNC: 151 MG/DL (CALC)
PH UR STRIP: 7 [PH] (ref 5–8)
PLATELET # BLD AUTO: 275 THOUSAND/UL (ref 140–400)
PMV BLD REES-ECKER: 11.3 FL (ref 7.5–12.5)
POTASSIUM SERPL-SCNC: 4.2 MMOL/L (ref 3.5–5.3)
PROT SERPL-MCNC: 7.4 G/DL (ref 6.1–8.1)
PROT UR QL STRIP: NEGATIVE
RBC # BLD AUTO: 5.06 MILLION/UL (ref 3.8–5.1)
RBC #/AREA URNS HPF: ABNORMAL /HPF
SODIUM SERPL-SCNC: 139 MMOL/L (ref 135–146)
SP GR UR STRIP: 1.02 (ref 1–1.03)
SQUAMOUS #/AREA URNS HPF: ABNORMAL /HPF
TRIGL SERPL-MCNC: 102 MG/DL
TSH SERPL-ACNC: 3.34 MIU/L (ref 0.4–4.5)
WBC # BLD AUTO: 4.2 THOUSAND/UL (ref 3.8–10.8)
WBC #/AREA URNS HPF: ABNORMAL /HPF

## 2021-06-08 ENCOUNTER — CLINICAL SUPPORT (OUTPATIENT)
Dept: FAMILY MEDICINE | Facility: CLINIC | Age: 71
End: 2021-06-08

## 2021-06-08 ENCOUNTER — TELEPHONE (OUTPATIENT)
Dept: FAMILY MEDICINE | Facility: CLINIC | Age: 71
End: 2021-06-08

## 2021-06-08 VITALS — OXYGEN SATURATION: 98 % | HEART RATE: 73 BPM | SYSTOLIC BLOOD PRESSURE: 132 MMHG | DIASTOLIC BLOOD PRESSURE: 72 MMHG

## 2021-07-06 ENCOUNTER — OFFICE VISIT (OUTPATIENT)
Dept: FAMILY MEDICINE | Facility: CLINIC | Age: 71
End: 2021-07-06
Payer: MEDICARE

## 2021-07-06 VITALS
WEIGHT: 131.38 LBS | BODY MASS INDEX: 26.48 KG/M2 | HEART RATE: 64 BPM | DIASTOLIC BLOOD PRESSURE: 84 MMHG | HEIGHT: 59 IN | SYSTOLIC BLOOD PRESSURE: 130 MMHG

## 2021-07-06 DIAGNOSIS — I10 ESSENTIAL HYPERTENSION: Primary | ICD-10-CM

## 2021-07-06 PROCEDURE — 99213 PR OFFICE/OUTPT VISIT, EST, LEVL III, 20-29 MIN: ICD-10-PCS | Mod: S$GLB,,, | Performed by: PHYSICIAN ASSISTANT

## 2021-07-06 PROCEDURE — 99213 OFFICE O/P EST LOW 20 MIN: CPT | Mod: S$GLB,,, | Performed by: PHYSICIAN ASSISTANT

## 2021-07-06 RX ORDER — LISINOPRIL 20 MG/1
20 TABLET ORAL DAILY
Qty: 90 TABLET | Refills: 1 | Status: SHIPPED | OUTPATIENT
Start: 2021-07-06 | End: 2022-01-06 | Stop reason: SDUPTHER

## 2021-10-22 ENCOUNTER — OFFICE VISIT (OUTPATIENT)
Dept: ORTHOPEDICS | Facility: CLINIC | Age: 71
End: 2021-10-22
Payer: MEDICARE

## 2021-10-22 VITALS
HEIGHT: 59 IN | SYSTOLIC BLOOD PRESSURE: 144 MMHG | HEART RATE: 64 BPM | WEIGHT: 131 LBS | OXYGEN SATURATION: 99 % | BODY MASS INDEX: 26.41 KG/M2 | DIASTOLIC BLOOD PRESSURE: 80 MMHG

## 2021-10-22 DIAGNOSIS — M65.332 TRIGGER FINGER, LEFT MIDDLE FINGER: ICD-10-CM

## 2021-10-22 DIAGNOSIS — M65.342 TRIGGER FINGER, LEFT RING FINGER: ICD-10-CM

## 2021-10-22 DIAGNOSIS — M17.11 PRIMARY OSTEOARTHRITIS OF RIGHT KNEE: Primary | ICD-10-CM

## 2021-10-22 PROCEDURE — 99213 PR OFFICE/OUTPT VISIT, EST, LEVL III, 20-29 MIN: ICD-10-PCS | Mod: S$GLB,,, | Performed by: PHYSICIAN ASSISTANT

## 2021-10-22 PROCEDURE — 99213 OFFICE O/P EST LOW 20 MIN: CPT | Mod: S$GLB,,, | Performed by: PHYSICIAN ASSISTANT

## 2022-01-06 ENCOUNTER — OFFICE VISIT (OUTPATIENT)
Dept: FAMILY MEDICINE | Facility: CLINIC | Age: 72
End: 2022-01-06
Payer: MEDICARE

## 2022-01-06 VITALS
DIASTOLIC BLOOD PRESSURE: 80 MMHG | BODY MASS INDEX: 26 KG/M2 | HEART RATE: 60 BPM | SYSTOLIC BLOOD PRESSURE: 132 MMHG | WEIGHT: 129 LBS | HEIGHT: 59 IN

## 2022-01-06 DIAGNOSIS — Z12.31 SCREENING MAMMOGRAM FOR HIGH-RISK PATIENT: ICD-10-CM

## 2022-01-06 DIAGNOSIS — G25.2 INTENTION TREMOR: ICD-10-CM

## 2022-01-06 DIAGNOSIS — I10 ESSENTIAL HYPERTENSION: Primary | ICD-10-CM

## 2022-01-06 PROCEDURE — 99214 OFFICE O/P EST MOD 30 MIN: CPT | Mod: S$GLB,,, | Performed by: PHYSICIAN ASSISTANT

## 2022-01-06 PROCEDURE — 99214 PR OFFICE/OUTPT VISIT, EST, LEVL IV, 30-39 MIN: ICD-10-PCS | Mod: S$GLB,,, | Performed by: PHYSICIAN ASSISTANT

## 2022-01-06 RX ORDER — LISINOPRIL 20 MG/1
20 TABLET ORAL DAILY
Qty: 90 TABLET | Refills: 1 | Status: SHIPPED | OUTPATIENT
Start: 2022-01-06 | End: 2022-08-09

## 2022-01-06 NOTE — PROGRESS NOTES
SUBJECTIVE:    Patient ID: Lina Ventura is a 71 y.o. female.    Chief Complaint: Hypertension (No bottles//refuses flu shot //refuses bone density// mammo set up//no complaints-MJ)    This is a very pleasant 71 year old female who presents today for regular checkup.  Just treated for hypertension with lisinopril.  Full labs completed 6 months prior.  Very healthy individual.  She adheres to heart healthy diet and tries to exercise on a daily basis. Recently retired and enjoying some extra free time. Knees actually feeling a great deal better. Reports a benign intention tremor that does not cause her any adjustment to her normal routine.      No visits with results within 6 Month(s) from this visit.   Latest known visit with results is:   Telephone on 05/19/2021   Component Date Value Ref Range Status    WBC 06/01/2021 4.2  3.8 - 10.8 Thousand/uL Final    RBC 06/01/2021 5.06  3.80 - 5.10 Million/uL Final    Hemoglobin 06/01/2021 14.1  11.7 - 15.5 g/dL Final    Hematocrit 06/01/2021 43.7  35.0 - 45.0 % Final    MCV 06/01/2021 86.4  80.0 - 100.0 fL Final    MCH 06/01/2021 27.9  27.0 - 33.0 pg Final    MCHC 06/01/2021 32.3  32.0 - 36.0 g/dL Final    RDW 06/01/2021 14.3  11.0 - 15.0 % Final    Platelets 06/01/2021 275  140 - 400 Thousand/uL Final    MPV 06/01/2021 11.3  7.5 - 12.5 fL Final    Neutrophils, Abs 06/01/2021 1,684  1,500 - 7,800 cells/uL Final    Lymph # 06/01/2021 1,793  850 - 3,900 cells/uL Final    Mono # 06/01/2021 420  200 - 950 cells/uL Final    Eos # 06/01/2021 260  15 - 500 cells/uL Final    Baso # 06/01/2021 42  0 - 200 cells/uL Final    Neutrophils Relative 06/01/2021 40.1  % Final    Lymph % 06/01/2021 42.7  % Final    Mono % 06/01/2021 10.0  % Final    Eosinophil % 06/01/2021 6.2  % Final    Basophil % 06/01/2021 1.0  % Final    Cholesterol 06/01/2021 222* <200 mg/dL Final    HDL 06/01/2021 71  > OR = 50 mg/dL Final    Triglycerides 06/01/2021 102  <150 mg/dL Final     LDL Cholesterol 06/01/2021 131* mg/dL (calc) Final    HDL/Cholesterol Ratio 06/01/2021 3.1  <5.0 (calc) Final    Non HDL Chol. (LDL+VLDL) 06/01/2021 151* <130 mg/dL (calc) Final    Glucose 06/01/2021 98  65 - 99 mg/dL Final    BUN 06/01/2021 18  7 - 25 mg/dL Final    Creatinine 06/01/2021 0.76  0.60 - 0.93 mg/dL Final    eGFR if non African American 06/01/2021 79  > OR = 60 mL/min/1.73m2 Final    eGFR if  06/01/2021 91  > OR = 60 mL/min/1.73m2 Final    BUN/Creatinine Ratio 06/01/2021 NOT APPLICABLE  6 - 22 (calc) Final    Sodium 06/01/2021 139  135 - 146 mmol/L Final    Potassium 06/01/2021 4.2  3.5 - 5.3 mmol/L Final    Chloride 06/01/2021 103  98 - 110 mmol/L Final    CO2 06/01/2021 24  20 - 32 mmol/L Final    Calcium 06/01/2021 9.1  8.6 - 10.4 mg/dL Final    Total Protein 06/01/2021 7.4  6.1 - 8.1 g/dL Final    Albumin 06/01/2021 4.0  3.6 - 5.1 g/dL Final    Globulin, Total 06/01/2021 3.4  1.9 - 3.7 g/dL (calc) Final    Albumin/Globulin Ratio 06/01/2021 1.2  1.0 - 2.5 (calc) Final    Total Bilirubin 06/01/2021 0.7  0.2 - 1.2 mg/dL Final    Alkaline Phosphatase 06/01/2021 86  37 - 153 U/L Final    AST 06/01/2021 16  10 - 35 U/L Final    ALT 06/01/2021 12  6 - 29 U/L Final    TSH w/reflex to FT4 06/01/2021 3.34  0.40 - 4.50 mIU/L Final    Color, UA 06/01/2021 YELLOW  YELLOW Final    Appearance, UA 06/01/2021 CLEAR  CLEAR Final    Specific Gravity, UA 06/01/2021 1.018  1.001 - 1.035 Final    pH, UA 06/01/2021 7.0  5.0 - 8.0 Final    Glucose, UA 06/01/2021 NEGATIVE  NEGATIVE Final    Bilirubin, UA 06/01/2021 NEGATIVE  NEGATIVE Final    Ketones, UA 06/01/2021 NEGATIVE  NEGATIVE Final    Occult Blood UA 06/01/2021 1+* NEGATIVE Final    Protein, UA 06/01/2021 NEGATIVE  NEGATIVE Final    Nitrite, UA 06/01/2021 NEGATIVE  NEGATIVE Final    Leukocytes, UA 06/01/2021 NEGATIVE  NEGATIVE Final    WBC Casts, UA 06/01/2021 NONE SEEN  < OR = 5 /HPF Final    RBC Casts, UA  06/01/2021 3-10* < OR = 2 /HPF Final    Squam Epithel, UA 06/01/2021 NONE SEEN  < OR = 5 /HPF Final    Bacteria, UA 06/01/2021 NONE SEEN  NONE SEEN /HPF Final    Hyaline Casts, UA 06/01/2021 NONE SEEN  NONE SEEN /LPF Final    Reflexive Urine Culture 06/01/2021    Final    Creatinine, Urine 06/01/2021 86  20 - 275 mg/dL Final    Microalb, Ur 06/01/2021 0.2  See Note: mg/dL Final    Microalb/Creat Ratio 06/01/2021 2  <30 mcg/mg creat Final       Past Medical History:   Diagnosis Date    Cholelithiasis      Past Surgical History:   Procedure Laterality Date    CHOLECYSTECTOMY  04/24/2017    Laparoscopic      Family History   Problem Relation Age of Onset    Breast cancer Maternal Aunt         In her 30's       Marital Status: Single  Alcohol History:  reports no history of alcohol use.  Tobacco History:  reports that she has never smoked. She has never used smokeless tobacco.  Drug History:  reports no history of drug use.    Review of patient's allergies indicates:   Allergen Reactions    Bactrim [sulfamethoxazole-trimethoprim] Palpitations and Other (See Comments)     Chest pain       Current Outpatient Medications:     cholecalciferol, vitamin D3, 4,000 unit Cap, Vitamin D3 4,000 unit capsule  Take 1 capsule every day by oral route., Disp: , Rfl:     lisinopriL (PRINIVIL,ZESTRIL) 20 MG tablet, Take 1 tablet (20 mg total) by mouth once daily., Disp: 90 tablet, Rfl: 1    pantoprazole (PROTONIX) 40 MG tablet, Take 40 mg by mouth once daily., Disp: , Rfl:     potassium 99 mg Tab, Take by mouth once., Disp: , Rfl:     Review of Systems   Constitutional: Negative for appetite change, chills, fatigue, fever and unexpected weight change.   HENT: Negative for congestion.    Respiratory: Negative for cough, chest tightness and shortness of breath.    Cardiovascular: Negative for chest pain and palpitations.   Gastrointestinal: Negative for abdominal distention and abdominal pain.   Endocrine: Negative for cold  "intolerance and heat intolerance.   Genitourinary: Negative for difficulty urinating and dysuria.   Musculoskeletal: Negative for arthralgias and back pain.   Neurological: Negative for dizziness, weakness and headaches.          Objective:      Vitals:    01/06/22 0739   BP: 132/80   Pulse: 60   Weight: 58.5 kg (129 lb)   Height: 4' 11" (1.499 m)     Physical Exam  Constitutional:       General: She is not in acute distress.     Appearance: She is well-developed and well-nourished.   HENT:      Head: Normocephalic and atraumatic.   Eyes:      Extraocular Movements: EOM normal.      Conjunctiva/sclera: Conjunctivae normal.      Pupils: Pupils are equal, round, and reactive to light.   Neck:      Thyroid: No thyromegaly.   Cardiovascular:      Rate and Rhythm: Normal rate and regular rhythm.      Pulses: Intact distal pulses.      Heart sounds: Normal heart sounds.   Pulmonary:      Effort: Pulmonary effort is normal.      Breath sounds: Normal breath sounds.   Abdominal:      General: Bowel sounds are normal. There is no distension.      Palpations: Abdomen is soft.      Tenderness: There is no abdominal tenderness.   Musculoskeletal:         General: Normal range of motion.      Cervical back: Normal range of motion and neck supple.   Skin:     General: Skin is warm and dry.      Findings: No erythema.   Neurological:      Mental Status: She is alert and oriented to person, place, and time.      Cranial Nerves: No cranial nerve deficit.   Psychiatric:         Mood and Affect: Mood and affect normal.           Assessment:       1. Essential hypertension    2. Screening mammogram for high-risk patient    3. Intention tremor         Plan:       Essential hypertension  Comments:  Pressure continues to be doing MUCH better today. Continue with the low sodium diet. lisinopril refills as needed.  Orders:  -     lisinopriL (PRINIVIL,ZESTRIL) 20 MG tablet; Take 1 tablet (20 mg total) by mouth once daily.  Dispense: 90 " tablet; Refill: 1    Screening mammogram for high-risk patient  -     Mammo Digital Screening Bilat; Future; Expected date: 01/06/2022    Intention tremor  Comments:  benign. not worrisome. If continues to progress can consider treatment at that time.      Follow up in about 6 months (around 7/6/2022) for Annual Physical.        1/6/2022 Tylor Brewer PA-C

## 2022-01-06 NOTE — PATIENT INSTRUCTIONS
Patient Education       DASH Diet   About this topic   DASH stands for Dietary Approaches to Stop Hypertension. The DASH diet may help you lower blood pressure. It may also help keep you from getting high blood pressure. You will eat less fat and more fiber on the DASH diet.  This diet gives you more minerals that fight high blood pressure. Some nutrients in this diet are:  · Potassium ? Acts to help you get rid of salt. This may help to lower blood pressure.  · Calcium ? Makes blood vessels and muscles work the right way  · Magnesium - Helps blood vessels relax  · Fiber ? Helps you feel full. It also helps digestion.  What will the results be?   The DASH diet may help you:  · Lower your blood pressure and cholesterol  · Lower your risk for cancer, heart disease, heart attack, and stroke. It may also lower your risk for heart failure, kidney stones, and diabetes.  · Lose weight or keep a healthy weight  What lifestyle changes are needed?   · Add regular exercise to get the most help from this diet.  · Try to lower stress. Find ways to relax.  · Stop smoking. Avoid secondhand smoke.  · Limit alcohol intake.  What changes to diet are needed?   · Know about poor eating habits. Then, you can fix them as you work with the program.  · This diet encourages fruits and vegetables, whole grains, lean meats, healthy fats, and low-fat or fat-free dairy products.  · This diet is lower in saturated fats, trans-fats, cholesterol, added sugars, and sodium.  Who should use this diet?   This eating plan is good for the whole family. It is also good for people with high blood pressure and those at risk for high blood pressure.  What foods are good to eat?   · Grains: Try to eat 6 to 8 servings of whole grain, high fiber foods each day. These are bread, cereals, brown rice, or pasta.  · Fruits and vegetables: Eat 4 to 5 servings each day. Try to pick many kinds and colors. Fresh or frozen are best. Look for low sodium or salt-free if  you choose canned.  · Dairy: Try to eat 2 to 3 servings of fat free and low fat milk products each day.  · Lean meats, poultry, and seafood: Try to eat 6 servings or less of lean meats, poultry, and seafood each day. Try to choose more low fat or lean meats like chicken and turkey. Eat less red meat. Eat more fish instead.  · Nuts, seeds, and legumes (dry beans and peas): Try to eat 4 to 5 servings each week. Try to pick nuts such as almonds and walnuts, sunflower seeds, peanut butter, soy beans, lentils, kidney beans, and split peas.  · Fats and oils: Try to eat 2 to 3 servings of fats and oils each day. Eat good fats found in fish, nuts, and avocados. Try using olive oil or vegetable oils such as canola oil. Other good oils to try are corn, safflower, sunflower, or soybean oils. Use low-sodium and low-fat salad dressing and mayonnaise.  · Condiments: Pepper, herbs, spices, vinegar, lemon or lime juices are great for seasoning. Be careful to choose low-sodium or salt-free products if you use broths, soups, or soy sauce.  · Sweets: Try to eat less than 5 servings each week. Choose low-fat and trans fat-free desserts. These are things like fruit flavored gelatin, sorbet, jelly beans, latoya crackers, animal crackers, low-fat fig bars, and marycarmen snaps. Eat fruit to satisfy your desire for sweets.     What foods should be limited or avoided?   · Grains: Salted breads, rolls, crackers, quick breads, self-rising flours, biscuit mixes, regular bread crumbs, instant hot cereals, commercially-prepared rice, pasta, stuffing mixes  · Fruits and vegetables: Commercially-prepared potatoes and vegetable mixes, regular canned vegetables and juices, vegetables frozen with sauce or pickled vegetables, processed fruits with salt or sodium  · Milk: Whole milk, malted milk, chocolate milk, buttermilk, cheese, ice cream  · Meats and beans: Smoked, cured, salted, or canned fish; meats or poultry such as barrios, sausages, sardines;  high-fat cuts of meat like beef, lamb, or pork; chicken with the skin on it  · Fats: Cut back on solid fats like butter, lard, and margarine. Eat less food with high saturated fat, cholesterol and total fat.  · Condiments and snacks: Salted and canned peas, beans, and olives; salted snack foods; fried foods; soda or other sweetened drinks  · Sweets: High-fat baked goods such as muffins, donuts, pastries, commercial baked goods, candy bars  · If you choose to drink alcohol, limit the amount you drink. Women should have 1 drink or less per day and men should have 2 drinks or less per day.  Helpful tips   · Avoid eating canned vegetables and processed foods. These have a lot of salt in them. Look for a low-salt or low-sodium choice.  · Try baking or broiling instead of frying food.  · Write down the foods you eat. This will help you track what you have eaten each week.  · When you go to a grocery store, have a list or a meal plan. Do not shop when you are hungry to avoid cravings for foods.  · Read food labels with care. They will show you how much is in a serving. The amount is given as a percentage of the total amount you need each day. Reading labels will help you make healthy food choices.       · Avoid fast foods.  · Talk to your doctor or dietitian to see if you need vitamin and mineral supplements to help you balance your diet.  · Talk to a dietitian for help.  Where can I learn more?   Academy [employer] Dietetics  https://www.eatright.org/health/wellness/heart-and-cardiovascular-health/dash-diet-reducing-hypertension-through-diet-and-lifestyle   FamilyDoctor.org  http://familydoctor.org/familydoctor/en/prevention-wellness/food-nutrition/weight-loss/the-dash-diet-healthy-eating-to-control-your-blood-pressure.html   Last Reviewed Date   2021-03-15  Consumer Information Use and Disclaimer   This information is not specific medical advice and does not replace information you receive from your health care  provider. This is only a brief summary of general information. It does NOT include all information about conditions, illnesses, injuries, tests, procedures, treatments, therapies, discharge instructions or life-style choices that may apply to you. You must talk with your health care provider for complete information about your health and treatment options. This information should not be used to decide whether or not to accept your health care providers advice, instructions or recommendations. Only your health care provider has the knowledge and training to provide advice that is right for you.  Copyright   Copyright © 2021 [employer] and its affiliates and/or licensors. All rights reserved.  Patient Education       Heart Healthy Diet   General   With a heart healthy food plan, you will learn to make better food choices. This diet may help you lower your blood cholesterol level, manage your blood pressure, and lower your risk for heart problems. Smaller portions may also be helpful.  Sodium is a type of mineral found in many foods. It helps keep the balance of fluids in your body. Too much sodium can raise your blood pressure. It can also make you take on extra water. This is called edema. Pay careful attention to how much salt or sodium is in your food. You may need to avoid salt or eat foods with less sodium.  Cholesterol is a fat-like, waxy substance in your blood. It is normal to have some cholesterol in your blood because your body makes it. You also get extra cholesterol from all animal products. These are foods like meats, eggs, and dairy products. Too much cholesterol in your blood can block or damage your blood vessels. This can lead to a heart attack or stroke.  Fats in your food have calories which give energy. Not all fats are bad. Some fats are healthy, like the fat found in fish, nuts, and olive oil. These are called unsaturated fats. They help manage body functions and lower cholesterol levels.  Learn about the best fats to use in your diet and where to use them. Eating too much fat may make you more likely to weigh more than is healthy. This raises your risk of many heart problems.  Fiber is found in plants. Meat and dairy products do not have fiber in them. Fiber can help you lower your unhealthy cholesterol level. You may need more water as you eat more fiber so you do not get hard stools.  What lifestyle changes are needed?   Eat a healthy diet and workout often. Try to use as many calories as you take in each day.  What changes to diet are needed?   · Eat oily fish at least 2 times a week. These are fish like tuna, salmon, and mackerel.  · Limit sodium to no more than 2,300 mg of sodium per day. This is about 1 teaspoon (5 grams) of table salt. Use little or no salt when making food. Try other spices or seasoning instead.  · Limit how much cholesterol you eat to less than 300 mg per day. You can do this by having lean meats. Also eat lots of fruits, vegetables, and fat-free and low-fat dairy products.  · Limit how much trans fats you eat. Trans fats are found in many processed foods like stick margarine, shortening, and some fried foods. Also, lower how much hydrogenated fats you eat. They are used to make pastries, biscuits, cookies, crackers, chips, and many snack foods.  · Have no more than 1 drink per day of beer, wine, and mixed drinks (alcohol).  Who should use this diet?   A heart healthy diet is good for everyone.  What foods are good to eat?   · Grains: Try to eat 6 to 8 servings of whole grain, high fiber foods each day. These are whole grain bread, cereals, brown rice, or pasta.  · Fruits and vegetables: Eat 4 to 5 servings each day. Try to pick many kinds and colors. Try to eat more that are fresh or frozen. Look for low sodium or salt-free if you choose canned. Rinse canned items before cooking or eating. Dried peas, beans, and lentils are also good.  · Dairy: Choose low fat (1%) or  fat-free milk. Eat nonfat or low-fat products.  · Protein: Try to eat more low fat or lean meats like chicken and turkey. Eat less red meat and eat more fish, eggs, egg whites, and beans instead.  · Fats: Use good fats found in fish, nuts, and avocados. Try using olive oil, canola oil, and low-sodium and low-fat salad dressing and mayonnaise. Use corn, safflower, sunflower, and soybean oils.  · Condiments: Use low-sodium or salt-free broths, soups, soy sauce, and condiments. Pepper, herbs, spices, vinegar, lemon or lime juices are great for seasoning. Sugar, cocoa powder, honey, syrup, and jams may be eaten in small amounts.  · Sweets: Low-fat, trans fat-free cookies, cakes, and pies; latoya crackers; animal crackers; low-fat fig bars; and marycarmen snaps.     What foods should be limited or avoided?   · Grains: Salted breads, rolls, crackers, quick breads, self-rising flours, biscuit mixes, regular bread crumbs, instant hot cereals, commercially-prepared rice, pasta, stuffing mixes  · Fruits and vegetables: Commercially-prepared potatoes and vegetable mixes, regular canned vegetables and juices, vegetables frozen with sauce or pickled vegetables, processed fruits with added sugar or salt  · Dairy: Whole milk, malted milk, chocolate milk, buttermilk  · Protein: Smoked, cured, salted, or canned meat, fish, or poultry such as barrios and sausages  · Fats: Cut back on solid fats like butter, lard, and margarine.  · Condiments and snacks: Salted and canned peas, beans, and olives; salted snack foods; fried foods; soda, juices, or other sweetened drinks; commercially-softened water. Miso, salsa, ketchup, barbecue sauce, Worcestershire sauce, soy sauce, and teriyaki sauce are also high in salt.  · Sweets: High-fat baked goods such as muffins, donuts, pastries, commercial baked goods  Helpful tips   · When you go to a grocery store, have a list or a meal plan. Do not shop when you are hungry to avoid cravings for foods.  · You  need to know about the sodium and fat content of the food you eat. Read food labels with care. They will show you how much of each is in a serving. This amount is given as a percentage of the total amount you need each day. Reading the labels will help you make healthy food choices.     · Avoid fast foods.  · Watch your portions when eating out. Split an order or bring home half for another meal.     · Talk to a dietitian for help.  Where can I learn more?   [employer]  https://familydoctor.org/diet-and-exercise-for-a-healthy-heart/   American Heart Association  https://www.heart.org/en/healthy-living/healthy-eating/eat-smart/nutrition-basics/aha-diet-and-lifestyle-recommendations   [facility] [[email or web address]]  https://www.nhs.uk/live-well/eat-well/   Last Reviewed Date   2020-03-27  Consumer Information Use and Disclaimer   This information is not specific medical advice and does not replace information you receive from your health care provider. This is only a brief summary of general information. It does NOT include all information about conditions, illnesses, injuries, tests, procedures, treatments, therapies, discharge instructions or life-style choices that may apply to you. You must talk with your health care provider for complete information about your health and treatment options. This information should not be used to decide whether or not to accept your health care providers advice, instructions or recommendations. Only your health care provider has the knowledge and training to provide advice that is right for you.  Copyright   Copyright © 2021 [employer], [employer]. and its affiliates and/or licensors. All rights reserved.

## 2022-01-12 ENCOUNTER — TELEPHONE (OUTPATIENT)
Dept: FAMILY MEDICINE | Facility: CLINIC | Age: 72
End: 2022-01-12
Payer: MEDICARE

## 2022-01-12 NOTE — TELEPHONE ENCOUNTER
----- Message from Josette Hartman sent at 1/12/2022 10:47 AM CST -----  Regarding: UNSCHEDULED EXAM  We have made 3 attempts to contact this pt to  schedule their Screening Mammogram and have been unable to reach them therefore we are removing this from our work queue. We just wanted to make you aware of this in case you wanted to reach out to the patient.     Thanks,   [facility] Centralized Scheduling

## 2022-08-01 ENCOUNTER — TELEPHONE (OUTPATIENT)
Dept: FAMILY MEDICINE | Facility: CLINIC | Age: 72
End: 2022-08-01

## 2022-08-01 NOTE — TELEPHONE ENCOUNTER
----- Message from Madiha Santillan sent at 8/1/2022  8:13 AM CDT -----  Pt needs an early morning appointment within the next couple of weeks. Please advise. Pt #[phone] or [phone]

## 2022-08-09 ENCOUNTER — OFFICE VISIT (OUTPATIENT)
Dept: FAMILY MEDICINE | Facility: CLINIC | Age: 72
End: 2022-08-09
Payer: MEDICARE

## 2022-08-09 VITALS
DIASTOLIC BLOOD PRESSURE: 94 MMHG | WEIGHT: 129 LBS | HEART RATE: 70 BPM | OXYGEN SATURATION: 100 % | RESPIRATION RATE: 19 BRPM | BODY MASS INDEX: 26.05 KG/M2 | SYSTOLIC BLOOD PRESSURE: 128 MMHG

## 2022-08-09 DIAGNOSIS — M65.332 TRIGGER MIDDLE FINGER OF LEFT HAND: ICD-10-CM

## 2022-08-09 DIAGNOSIS — I10 ESSENTIAL HYPERTENSION: ICD-10-CM

## 2022-08-09 DIAGNOSIS — Z79.899 ENCOUNTER FOR LONG-TERM (CURRENT) USE OF OTHER MEDICATIONS: Primary | ICD-10-CM

## 2022-08-09 DIAGNOSIS — R49.0 HOARSENESS OF VOICE: ICD-10-CM

## 2022-08-09 PROCEDURE — 99214 OFFICE O/P EST MOD 30 MIN: CPT | Mod: S$GLB,,, | Performed by: PHYSICIAN ASSISTANT

## 2022-08-09 PROCEDURE — 99214 PR OFFICE/OUTPT VISIT, EST, LEVL IV, 30-39 MIN: ICD-10-PCS | Mod: S$GLB,,, | Performed by: PHYSICIAN ASSISTANT

## 2022-08-09 RX ORDER — LISINOPRIL AND HYDROCHLOROTHIAZIDE 12.5; 2 MG/1; MG/1
1 TABLET ORAL DAILY
Qty: 90 TABLET | Refills: 3 | Status: SHIPPED | OUTPATIENT
Start: 2022-08-09 | End: 2023-02-07 | Stop reason: SDUPTHER

## 2022-08-09 RX ORDER — LISINOPRIL 20 MG/1
20 TABLET ORAL DAILY
Qty: 90 TABLET | Refills: 1 | Status: CANCELLED | OUTPATIENT
Start: 2022-08-09 | End: 2023-02-05

## 2022-08-09 NOTE — PROGRESS NOTES
SUBJECTIVE:    Patient ID: Lina Ventura is a 71 y.o. female.    Chief Complaint: Annual Exam, Hypertension (Running high, last night BP was high, better this morning at 132/90s*), Leg Pain (Left leg bruise last night with some pain, would like to make sure its not a clot ), Sinus Problem (Ongoing sinus concerns, would like a referral to ENT for sinus drip/losing voice), Hand Pain (Would like to see an ortho doctor for her left middle finger), and Fatigue    This is a 71-year-old female who presents today for a full checkup.  Pressure noted to be high in clinic and patient noticing high at home as well. There is a relation to diet and more salt in her diet. Finds that she is a bit more tired. Especially when she is outside in the yard or exerting herself. Slight bruising superficially to the Rt thigh and TTP. She is planning to see Dr. Gonzalez for another shot in her left hand for the triggering. She is also wishing to see ENT to evaluate the hoarseness and allergy.      No visits with results within 6 Month(s) from this visit.   Latest known visit with results is:   Telephone on 05/19/2021   Component Date Value Ref Range Status    WBC 06/01/2021 4.2  3.8 - 10.8 Thousand/uL Final    RBC 06/01/2021 5.06  3.80 - 5.10 Million/uL Final    Hemoglobin 06/01/2021 14.1  11.7 - 15.5 g/dL Final    Hematocrit 06/01/2021 43.7  35.0 - 45.0 % Final    MCV 06/01/2021 86.4  80.0 - 100.0 fL Final    MCH 06/01/2021 27.9  27.0 - 33.0 pg Final    MCHC 06/01/2021 32.3  32.0 - 36.0 g/dL Final    RDW 06/01/2021 14.3  11.0 - 15.0 % Final    Platelets 06/01/2021 275  140 - 400 Thousand/uL Final    MPV 06/01/2021 11.3  7.5 - 12.5 fL Final    Neutrophils, Abs 06/01/2021 1,684  1,500 - 7,800 cells/uL Final    Lymph # 06/01/2021 1,793  850 - 3,900 cells/uL Final    Mono # 06/01/2021 420  200 - 950 cells/uL Final    Eos # 06/01/2021 260  15 - 500 cells/uL Final    Baso # 06/01/2021 42  0 - 200 cells/uL Final    Neutrophils  Relative 06/01/2021 40.1  % Final    Lymph % 06/01/2021 42.7  % Final    Mono % 06/01/2021 10.0  % Final    Eosinophil % 06/01/2021 6.2  % Final    Basophil % 06/01/2021 1.0  % Final    Cholesterol 06/01/2021 222 (A) <200 mg/dL Final    HDL 06/01/2021 71  > OR = 50 mg/dL Final    Triglycerides 06/01/2021 102  <150 mg/dL Final    LDL Cholesterol 06/01/2021 131 (A) mg/dL (calc) Final    HDL/Cholesterol Ratio 06/01/2021 3.1  <5.0 (calc) Final    Non HDL Chol. (LDL+VLDL) 06/01/2021 151 (A) <130 mg/dL (calc) Final    Glucose 06/01/2021 98  65 - 99 mg/dL Final    BUN 06/01/2021 18  7 - 25 mg/dL Final    Creatinine 06/01/2021 0.76  0.60 - 0.93 mg/dL Final    eGFR if non African American 06/01/2021 79  > OR = 60 mL/min/1.73m2 Final    eGFR if  06/01/2021 91  > OR = 60 mL/min/1.73m2 Final    BUN/Creatinine Ratio 06/01/2021 NOT APPLICABLE  6 - 22 (calc) Final    Sodium 06/01/2021 139  135 - 146 mmol/L Final    Potassium 06/01/2021 4.2  3.5 - 5.3 mmol/L Final    Chloride 06/01/2021 103  98 - 110 mmol/L Final    CO2 06/01/2021 24  20 - 32 mmol/L Final    Calcium 06/01/2021 9.1  8.6 - 10.4 mg/dL Final    Total Protein 06/01/2021 7.4  6.1 - 8.1 g/dL Final    Albumin 06/01/2021 4.0  3.6 - 5.1 g/dL Final    Globulin, Total 06/01/2021 3.4  1.9 - 3.7 g/dL (calc) Final    Albumin/Globulin Ratio 06/01/2021 1.2  1.0 - 2.5 (calc) Final    Total Bilirubin 06/01/2021 0.7  0.2 - 1.2 mg/dL Final    Alkaline Phosphatase 06/01/2021 86  37 - 153 U/L Final    AST 06/01/2021 16  10 - 35 U/L Final    ALT 06/01/2021 12  6 - 29 U/L Final    TSH w/reflex to FT4 06/01/2021 3.34  0.40 - 4.50 mIU/L Final    Color, UA 06/01/2021 YELLOW  YELLOW Final    Appearance, UA 06/01/2021 CLEAR  CLEAR Final    Specific Gravity, UA 06/01/2021 1.018  1.001 - 1.035 Final    pH, UA 06/01/2021 7.0  5.0 - 8.0 Final    Glucose, UA 06/01/2021 NEGATIVE  NEGATIVE Final    Bilirubin, UA 06/01/2021 NEGATIVE  NEGATIVE Final     Ketones, UA 06/01/2021 NEGATIVE  NEGATIVE Final    Occult Blood UA 06/01/2021 1+ (A) NEGATIVE Final    Protein, UA 06/01/2021 NEGATIVE  NEGATIVE Final    Nitrite, UA 06/01/2021 NEGATIVE  NEGATIVE Final    Leukocytes, UA 06/01/2021 NEGATIVE  NEGATIVE Final    WBC Casts, UA 06/01/2021 NONE SEEN  < OR = 5 /HPF Final    RBC Casts, UA 06/01/2021 3-10 (A) < OR = 2 /HPF Final    Squam Epithel, UA 06/01/2021 NONE SEEN  < OR = 5 /HPF Final    Bacteria, UA 06/01/2021 NONE SEEN  NONE SEEN /HPF Final    Hyaline Casts, UA 06/01/2021 NONE SEEN  NONE SEEN /LPF Final    Reflexive Urine Culture 06/01/2021    Final    Creatinine, Urine 06/01/2021 86  20 - 275 mg/dL Final    Microalb, Ur 06/01/2021 0.2  See Note: mg/dL Final    Microalb/Creat Ratio 06/01/2021 2  <30 mcg/mg creat Final       Past Medical History:   Diagnosis Date    Cholelithiasis      Past Surgical History:   Procedure Laterality Date    CHOLECYSTECTOMY  04/24/2017    Laparoscopic      Family History   Problem Relation Age of Onset    Breast cancer Maternal Aunt         In her 30's       Marital Status: Single  Alcohol History:  reports no history of alcohol use.  Tobacco History:  reports that she has never smoked. She has never used smokeless tobacco.  Drug History:  reports no history of drug use.    Review of patient's allergies indicates:   Allergen Reactions    Bactrim [sulfamethoxazole-trimethoprim] Palpitations and Other (See Comments)     Chest pain       Current Outpatient Medications:     cholecalciferol, vitamin D3, 4,000 unit Cap, Vitamin D3 4,000 unit capsule  Take 1 capsule every day by oral route., Disp: , Rfl:     pantoprazole (PROTONIX) 40 MG tablet, Take 40 mg by mouth daily as needed., Disp: , Rfl:     lisinopriL-hydrochlorothiazide (PRINZIDE,ZESTORETIC) 20-12.5 mg per tablet, Take 1 tablet by mouth once daily., Disp: 90 tablet, Rfl: 3    potassium 99 mg Tab, Take by mouth once., Disp: , Rfl:     Review of Systems    Constitutional: Negative for appetite change, chills, fatigue, fever and unexpected weight change.   HENT: Positive for postnasal drip, rhinorrhea, sore throat and voice change (hoarseness). Negative for congestion.    Respiratory: Negative for cough, chest tightness and shortness of breath.    Cardiovascular: Negative for chest pain and palpitations.   Gastrointestinal: Negative for abdominal distention and abdominal pain.   Endocrine: Negative for cold intolerance and heat intolerance.   Genitourinary: Negative for difficulty urinating and dysuria.   Musculoskeletal: Positive for arthralgias. Negative for back pain.   Neurological: Negative for dizziness, weakness and headaches.          Objective:      Vitals:    08/09/22 0757 08/09/22 0813   BP: (!) 134/94 (!) 128/94   Pulse: 70    Resp: 19    SpO2: 100%    Weight: 58.5 kg (129 lb)      Physical Exam  Constitutional:       General: She is not in acute distress.     Appearance: She is well-developed.   HENT:      Head: Normocephalic and atraumatic.   Eyes:      Conjunctiva/sclera: Conjunctivae normal.      Pupils: Pupils are equal, round, and reactive to light.   Neck:      Thyroid: No thyromegaly.   Cardiovascular:      Rate and Rhythm: Normal rate and regular rhythm.      Heart sounds: Normal heart sounds.   Pulmonary:      Effort: Pulmonary effort is normal.      Breath sounds: Normal breath sounds.   Abdominal:      General: Bowel sounds are normal. There is no distension.      Palpations: Abdomen is soft.      Tenderness: There is no abdominal tenderness.   Musculoskeletal:         General: Normal range of motion.      Cervical back: Normal range of motion and neck supple.   Skin:     General: Skin is warm and dry.      Findings: No erythema.   Neurological:      Mental Status: She is alert and oriented to person, place, and time.      Cranial Nerves: No cranial nerve deficit.           Assessment:       1. Encounter for long-term (current) use of other  medications    2. Essential hypertension    3. Trigger middle finger of left hand    4. Hoarseness of voice         Plan:       Encounter for long-term (current) use of other medications  Comments:  labs for ongoing pt care.  Orders:  -     CBC Auto Differential; Future; Expected date: 08/09/2022  -     Comprehensive Metabolic Panel; Future; Expected date: 08/09/2022  -     Lipid Panel; Future; Expected date: 08/09/2022  -     TSH w/reflex to FT4; Future; Expected date: 08/09/2022  -     Urinalysis, Reflex to Urine Culture Urine, Clean Catch  -     Microalbumin/creatinine urine ratio    Essential hypertension  Comments:  Pressure continues to be slightly high. we will go ahead and add HCTZ now. let me know how pressure is after 1-2 wks.  Orders:  -     lisinopriL-hydrochlorothiazide (PRINZIDE,ZESTORETIC) 20-12.5 mg per tablet; Take 1 tablet by mouth once daily.  Dispense: 90 tablet; Refill: 3    Trigger middle finger of left hand  Comments:  referral to DR. Gonzalez to evaluate further.   Orders:  -     Ambulatory referral/consult to Orthopedics; Future; Expected date: 08/09/2022    Hoarseness of voice  Comments:  ongoing. ENT referral now for further eval and management  Orders:  -     Ambulatory referral/consult to ENT; Future; Expected date: 08/09/2022      No follow-ups on file.        8/9/2022 Tylor Brewer PA-C

## 2022-08-10 LAB
ALBUMIN SERPL-MCNC: 3.9 G/DL (ref 3.6–5.1)
ALBUMIN/GLOB SERPL: 1.1 (CALC) (ref 1–2.5)
ALP SERPL-CCNC: 77 U/L (ref 37–153)
ALT SERPL-CCNC: 11 U/L (ref 6–29)
AST SERPL-CCNC: 13 U/L (ref 10–35)
BASOPHILS # BLD AUTO: 39 CELLS/UL (ref 0–200)
BASOPHILS NFR BLD AUTO: 0.7 %
BILIRUB SERPL-MCNC: 0.4 MG/DL (ref 0.2–1.2)
BUN SERPL-MCNC: 17 MG/DL (ref 7–25)
BUN/CREAT SERPL: NORMAL (CALC) (ref 6–22)
CALCIUM SERPL-MCNC: 9.3 MG/DL (ref 8.6–10.4)
CHLORIDE SERPL-SCNC: 103 MMOL/L (ref 98–110)
CHOLEST SERPL-MCNC: 223 MG/DL
CHOLEST/HDLC SERPL: 3.2 (CALC)
CO2 SERPL-SCNC: 28 MMOL/L (ref 20–32)
CREAT SERPL-MCNC: 0.79 MG/DL (ref 0.6–1)
EGFR: 79 ML/MIN/1.73M2
EOSINOPHIL # BLD AUTO: 207 CELLS/UL (ref 15–500)
EOSINOPHIL NFR BLD AUTO: 3.7 %
ERYTHROCYTE [DISTWIDTH] IN BLOOD BY AUTOMATED COUNT: 14.1 % (ref 11–15)
GLOBULIN SER CALC-MCNC: 3.5 G/DL (CALC) (ref 1.9–3.7)
GLUCOSE SERPL-MCNC: 96 MG/DL (ref 65–99)
HCT VFR BLD AUTO: 44.9 % (ref 35–45)
HDLC SERPL-MCNC: 70 MG/DL
HGB BLD-MCNC: 14.5 G/DL (ref 11.7–15.5)
LDLC SERPL CALC-MCNC: 127 MG/DL (CALC)
LYMPHOCYTES # BLD AUTO: 1378 CELLS/UL (ref 850–3900)
LYMPHOCYTES NFR BLD AUTO: 24.6 %
MCH RBC QN AUTO: 28.5 PG (ref 27–33)
MCHC RBC AUTO-ENTMCNC: 32.3 G/DL (ref 32–36)
MCV RBC AUTO: 88.4 FL (ref 80–100)
MONOCYTES # BLD AUTO: 392 CELLS/UL (ref 200–950)
MONOCYTES NFR BLD AUTO: 7 %
NEUTROPHILS # BLD AUTO: 3584 CELLS/UL (ref 1500–7800)
NEUTROPHILS NFR BLD AUTO: 64 %
NONHDLC SERPL-MCNC: 153 MG/DL (CALC)
PLATELET # BLD AUTO: 239 THOUSAND/UL (ref 140–400)
PMV BLD REES-ECKER: 11.3 FL (ref 7.5–12.5)
POTASSIUM SERPL-SCNC: 4.5 MMOL/L (ref 3.5–5.3)
PROT SERPL-MCNC: 7.4 G/DL (ref 6.1–8.1)
RBC # BLD AUTO: 5.08 MILLION/UL (ref 3.8–5.1)
SODIUM SERPL-SCNC: 139 MMOL/L (ref 135–146)
TRIGL SERPL-MCNC: 144 MG/DL
TSH SERPL-ACNC: 2.9 MIU/L (ref 0.4–4.5)
WBC # BLD AUTO: 5.6 THOUSAND/UL (ref 3.8–10.8)

## 2022-08-18 ENCOUNTER — TELEPHONE (OUTPATIENT)
Dept: FAMILY MEDICINE | Facility: CLINIC | Age: 72
End: 2022-08-18

## 2022-08-18 NOTE — TELEPHONE ENCOUNTER
No voice mail set up. Tired to call pt in regards to pt's recent lab results. Will try to call pt's back later.

## 2022-08-18 NOTE — TELEPHONE ENCOUNTER
----- Message from Tylor Brewer PA-C sent at 8/17/2022 10:24 PM CDT -----  Labs are all stable at this time. Continue as is and let me know if you have any further questions.

## 2022-08-18 NOTE — TELEPHONE ENCOUNTER
Mail box if full and could not leave a voice message for the pt to call back in regards to lab results.

## 2022-08-23 ENCOUNTER — TELEPHONE (OUTPATIENT)
Dept: FAMILY MEDICINE | Facility: CLINIC | Age: 72
End: 2022-08-23

## 2023-01-25 ENCOUNTER — TELEPHONE (OUTPATIENT)
Dept: FAMILY MEDICINE | Facility: CLINIC | Age: 73
End: 2023-01-25

## 2023-01-25 DIAGNOSIS — Z79.899 ENCOUNTER FOR LONG-TERM (CURRENT) USE OF OTHER MEDICATIONS: Primary | ICD-10-CM

## 2023-01-25 DIAGNOSIS — I10 ESSENTIAL HYPERTENSION: ICD-10-CM

## 2023-02-07 ENCOUNTER — OFFICE VISIT (OUTPATIENT)
Dept: FAMILY MEDICINE | Facility: CLINIC | Age: 73
End: 2023-02-07
Payer: MEDICARE

## 2023-02-07 VITALS
WEIGHT: 133 LBS | SYSTOLIC BLOOD PRESSURE: 130 MMHG | BODY MASS INDEX: 26.81 KG/M2 | DIASTOLIC BLOOD PRESSURE: 84 MMHG | HEIGHT: 59 IN | HEART RATE: 68 BPM

## 2023-02-07 DIAGNOSIS — I83.892 VARICOSE VEINS OF LEFT LEG WITH EDEMA: ICD-10-CM

## 2023-02-07 DIAGNOSIS — Z12.31 ENCOUNTER FOR SCREENING MAMMOGRAM FOR MALIGNANT NEOPLASM OF BREAST: ICD-10-CM

## 2023-02-07 DIAGNOSIS — I10 ESSENTIAL HYPERTENSION: Primary | ICD-10-CM

## 2023-02-07 PROCEDURE — 99214 OFFICE O/P EST MOD 30 MIN: CPT | Mod: S$GLB,,, | Performed by: PHYSICIAN ASSISTANT

## 2023-02-07 PROCEDURE — 99214 PR OFFICE/OUTPT VISIT, EST, LEVL IV, 30-39 MIN: ICD-10-PCS | Mod: S$GLB,,, | Performed by: PHYSICIAN ASSISTANT

## 2023-02-07 RX ORDER — LISINOPRIL AND HYDROCHLOROTHIAZIDE 12.5; 2 MG/1; MG/1
1 TABLET ORAL DAILY
Qty: 90 TABLET | Refills: 3 | Status: SHIPPED | OUTPATIENT
Start: 2023-02-07 | End: 2023-08-22 | Stop reason: SDUPTHER

## 2023-02-07 NOTE — PROGRESS NOTES
SUBJECTIVE:    Patient ID: Lnia Ventura is a 72 y.o. female.    Chief Complaint: Hypertension (Went over meds verbally, right leg pain burning for a while from groin all the way down to foot, worse at night, Mammo ordered, DEXA declined, PNA declined// SW)    This is a 72 year old female who presents today for annual check up. She reports burning pain in her legs, worse in the right that radiates from the groin to the ankle. The deep aching pain comes and goes, has became more uncomfortable the past couple weeks causing nighttime awakenings. Reports feelings of swelling around the hip/groin area associated with the pain. Patient denies any recent illness, falls, chest pain, or shortness of breath.      Office Visit on 08/09/2022   Component Date Value Ref Range Status    WBC 08/09/2022 5.6  3.8 - 10.8 Thousand/uL Final    RBC 08/09/2022 5.08  3.80 - 5.10 Million/uL Final    Hemoglobin 08/09/2022 14.5  11.7 - 15.5 g/dL Final    Hematocrit 08/09/2022 44.9  35.0 - 45.0 % Final    MCV 08/09/2022 88.4  80.0 - 100.0 fL Final    MCH 08/09/2022 28.5  27.0 - 33.0 pg Final    MCHC 08/09/2022 32.3  32.0 - 36.0 g/dL Final    RDW 08/09/2022 14.1  11.0 - 15.0 % Final    Platelets 08/09/2022 239  140 - 400 Thousand/uL Final    MPV 08/09/2022 11.3  7.5 - 12.5 fL Final    Neutrophils, Abs 08/09/2022 3,584  1,500 - 7,800 cells/uL Final    Lymph # 08/09/2022 1,378  850 - 3,900 cells/uL Final    Mono # 08/09/2022 392  200 - 950 cells/uL Final    Eos # 08/09/2022 207  15 - 500 cells/uL Final    Baso # 08/09/2022 39  0 - 200 cells/uL Final    Neutrophils Relative 08/09/2022 64  % Final    Lymph % 08/09/2022 24.6  % Final    Mono % 08/09/2022 7.0  % Final    Eosinophil % 08/09/2022 3.7  % Final    Basophil % 08/09/2022 0.7  % Final    Glucose 08/09/2022 96  65 - 99 mg/dL Final    BUN 08/09/2022 17  7 - 25 mg/dL Final    Creatinine 08/09/2022 0.79  0.60 - 1.00 mg/dL Final    eGFR 08/09/2022 79  > OR = 60 mL/min/1.73m2 Final     BUN/Creatinine Ratio 08/09/2022 NOT APPLICABLE  6 - 22 (calc) Final    Sodium 08/09/2022 139  135 - 146 mmol/L Final    Potassium 08/09/2022 4.5  3.5 - 5.3 mmol/L Final    Chloride 08/09/2022 103  98 - 110 mmol/L Final    CO2 08/09/2022 28  20 - 32 mmol/L Final    Calcium 08/09/2022 9.3  8.6 - 10.4 mg/dL Final    Total Protein 08/09/2022 7.4  6.1 - 8.1 g/dL Final    Albumin 08/09/2022 3.9  3.6 - 5.1 g/dL Final    Globulin, Total 08/09/2022 3.5  1.9 - 3.7 g/dL (calc) Final    Albumin/Globulin Ratio 08/09/2022 1.1  1.0 - 2.5 (calc) Final    Total Bilirubin 08/09/2022 0.4  0.2 - 1.2 mg/dL Final    Alkaline Phosphatase 08/09/2022 77  37 - 153 U/L Final    AST 08/09/2022 13  10 - 35 U/L Final    ALT 08/09/2022 11  6 - 29 U/L Final    Cholesterol 08/09/2022 223 (H)  <200 mg/dL Final    HDL 08/09/2022 70  > OR = 50 mg/dL Final    Triglycerides 08/09/2022 144  <150 mg/dL Final    LDL Cholesterol 08/09/2022 127 (H)  mg/dL (calc) Final    HDL/Cholesterol Ratio 08/09/2022 3.2  <5.0 (calc) Final    Non HDL Chol. (LDL+VLDL) 08/09/2022 153 (H)  <130 mg/dL (calc) Final    TSH w/reflex to FT4 08/09/2022 2.90  0.40 - 4.50 mIU/L Final       Past Medical History:   Diagnosis Date    Cholelithiasis      Past Surgical History:   Procedure Laterality Date    CHOLECYSTECTOMY  04/24/2017    Laparoscopic      Family History   Problem Relation Age of Onset    Melanoma Brother     Breast cancer Maternal Aunt         In her 30's       Marital Status: Single  Alcohol History:  reports no history of alcohol use.  Tobacco History:  reports that she has never smoked. She has never used smokeless tobacco.  Drug History:  reports no history of drug use.    Review of patient's allergies indicates:   Allergen Reactions    Bactrim [sulfamethoxazole-trimethoprim] Palpitations and Other (See Comments)     Chest pain       Current Outpatient Medications:     cholecalciferol, vitamin D3, 4,000 unit Cap, Vitamin D3 4,000 unit capsule  Take 1 capsule every  "day by oral route., Disp: , Rfl:     pantoprazole (PROTONIX) 40 MG tablet, Take 40 mg by mouth daily as needed., Disp: , Rfl:     potassium 99 mg Tab, Take by mouth once., Disp: , Rfl:     lisinopriL-hydrochlorothiazide (PRINZIDE,ZESTORETIC) 20-12.5 mg per tablet, Take 1 tablet by mouth once daily., Disp: 90 tablet, Rfl: 3    Review of Systems   Constitutional:  Negative for appetite change, chills, fatigue, fever and unexpected weight change.   HENT:  Negative for congestion, ear pain, sinus pain and sore throat.    Respiratory:  Negative for cough, chest tightness and shortness of breath.    Cardiovascular:  Negative for chest pain and palpitations.   Gastrointestinal:  Negative for abdominal distention, abdominal pain, constipation and diarrhea.   Endocrine: Negative for cold intolerance, heat intolerance and polyuria.   Genitourinary:  Negative for difficulty urinating, dysuria and urgency.   Musculoskeletal:  Positive for arthralgias and myalgias. Negative for back pain.   Neurological:  Negative for dizziness, weakness and headaches.        Objective:      Vitals:    02/07/23 0754   BP: 130/84   Pulse: 68   Weight: 60.3 kg (133 lb)   Height: 4' 10.5" (1.486 m)     Physical Exam  Constitutional:       General: She is not in acute distress.     Appearance: She is well-developed.   HENT:      Head: Normocephalic and atraumatic.   Eyes:      Conjunctiva/sclera: Conjunctivae normal.      Pupils: Pupils are equal, round, and reactive to light.   Neck:      Thyroid: No thyromegaly.   Cardiovascular:      Rate and Rhythm: Normal rate and regular rhythm.      Pulses: Normal pulses.      Heart sounds: Normal heart sounds.   Pulmonary:      Effort: Pulmonary effort is normal.      Breath sounds: Normal breath sounds. No wheezing or rales.   Abdominal:      General: Bowel sounds are normal. There is no distension.      Palpations: Abdomen is soft.      Tenderness: There is no abdominal tenderness.   Musculoskeletal:       "   General: Normal range of motion.      Cervical back: Normal range of motion and neck supple.      Right lower leg: No edema.      Left lower leg: No edema.   Skin:     General: Skin is warm and dry.      Findings: No erythema.      Comments: Varicose veins bilateral lower extremities   Neurological:      Mental Status: She is alert and oriented to person, place, and time.      Cranial Nerves: No cranial nerve deficit.         Assessment:       1. Essential hypertension    2. Encounter for screening mammogram for malignant neoplasm of breast    3. Varicose veins of left leg with edema           Plan:       Essential hypertension  Comments:  Pressure finally looks to be better controlled. continue as is.  Orders:  -     lisinopriL-hydrochlorothiazide (PRINZIDE,ZESTORETIC) 20-12.5 mg per tablet; Take 1 tablet by mouth once daily.  Dispense: 90 tablet; Refill: 3    Encounter for screening mammogram for malignant neoplasm of breast  Comments:  orders are placed for mammography to be completed now.  Orders:  -     Mammo Digital Screening Bilat w/ Gennaro; Future; Expected date: 02/07/2023    Varicose veins of left leg with edema  Comments:  Painful and she does appreciate swelling. wishes to discuss further with vasc surgery.  Orders:  -     Ambulatory referral/consult to Vascular Surgery; Future; Expected date: 02/07/2023      Follow up in about 6 months (around 8/7/2023).        2/7/2023 Tylor Brewer PA-C

## 2023-08-22 ENCOUNTER — OFFICE VISIT (OUTPATIENT)
Dept: FAMILY MEDICINE | Facility: CLINIC | Age: 73
End: 2023-08-22
Payer: MEDICARE

## 2023-08-22 ENCOUNTER — TELEPHONE (OUTPATIENT)
Dept: FAMILY MEDICINE | Facility: CLINIC | Age: 73
End: 2023-08-22

## 2023-08-22 VITALS
HEIGHT: 59 IN | OXYGEN SATURATION: 97 % | HEART RATE: 56 BPM | SYSTOLIC BLOOD PRESSURE: 132 MMHG | WEIGHT: 134 LBS | DIASTOLIC BLOOD PRESSURE: 94 MMHG | BODY MASS INDEX: 27.01 KG/M2

## 2023-08-22 DIAGNOSIS — I83.892 VARICOSE VEINS OF LEFT LEG WITH EDEMA: ICD-10-CM

## 2023-08-22 DIAGNOSIS — I10 ESSENTIAL HYPERTENSION: Primary | ICD-10-CM

## 2023-08-22 PROCEDURE — 99214 OFFICE O/P EST MOD 30 MIN: CPT | Mod: S$GLB,,, | Performed by: PHYSICIAN ASSISTANT

## 2023-08-22 PROCEDURE — 99214 PR OFFICE/OUTPT VISIT, EST, LEVL IV, 30-39 MIN: ICD-10-PCS | Mod: S$GLB,,, | Performed by: PHYSICIAN ASSISTANT

## 2023-08-22 RX ORDER — MAGNESIUM 30 MG
TABLET ORAL DAILY
COMMUNITY

## 2023-08-22 RX ORDER — LISINOPRIL AND HYDROCHLOROTHIAZIDE 12.5; 2 MG/1; MG/1
1 TABLET ORAL DAILY
Qty: 90 TABLET | Refills: 3 | Status: SHIPPED | OUTPATIENT
Start: 2023-08-22 | End: 2024-08-21

## 2023-08-22 RX ORDER — AMLODIPINE BESYLATE 2.5 MG/1
2.5 TABLET ORAL DAILY
Qty: 30 TABLET | Refills: 5 | Status: SHIPPED | OUTPATIENT
Start: 2023-08-22 | End: 2024-02-18

## 2023-08-22 NOTE — TELEPHONE ENCOUNTER
----- Message from Meenakshi Peacock sent at 8/22/2023 11:19 AM CDT -----   The patient saw Tylor today. She needs an early morning  3 month appointment.  PT'S # [phone] [id number]

## 2023-08-22 NOTE — PROGRESS NOTES
SUBJECTIVE:    Patient ID: Lina Ventura is a 72 y.o. female.    Chief Complaint: 6M Check Up    This is a 72 year old female presenting for 6 month follow up. Treated for hypertension. Reports feeling well overall. History of bilateral aching/shooting leg pain. [facility] Recent ultrasound of the legs was normal. CT lumbar spine was done last week; results still pending. Starting physical therapy next week for leg pains. She had [facility] 4/[facility]23; CBC/CMP/Lipids reviewed and wnl. Denies any new concerns at this time.        No visits with results within 6 Month(s) from this visit.   Latest known visit with results is:   Office Visit on 08/09/2022   Component Date Value Ref Range Status    WBC 08/09/2022 5.6  3.8 - 10.8 Thousand/uL Final    RBC 08/09/2022 5.08  3.80 - 5.10 Million/uL Final    Hemoglobin 08/09/2022 14.5  11.7 - 15.5 g/dL Final    Hematocrit 08/09/2022 44.9  35.0 - 45.0 % Final    MCV 08/09/2022 88.4  80.0 - 100.0 fL Final    MCH 08/09/2022 28.5  27.0 - 33.0 pg Final    MCHC 08/09/2022 32.3  32.0 - 36.0 g/dL Final    RDW 08/09/2022 14.1  11.0 - 15.0 % Final    Platelets 08/09/2022 239  140 - 400 Thousand/uL Final    MPV 08/09/2022 11.3  7.5 - 12.5 fL Final    Neutrophils, Abs 08/09/2022 3,584  1,500 - 7,800 cells/uL Final    Lymph # 08/09/2022 1,378  850 - 3,900 cells/uL Final    Mono # 08/09/2022 392  200 - 950 cells/uL Final    Eos # 08/09/2022 207  15 - 500 cells/uL Final    Baso # 08/09/2022 39  0 - 200 cells/uL Final    Neutrophils Relative 08/09/2022 64  % Final    Lymph % 08/09/2022 24.6  % Final    Mono % 08/09/2022 7.0  % Final    Eosinophil % 08/09/2022 3.7  % Final    Basophil % 08/09/2022 0.7  % Final    Glucose 08/09/2022 96  65 - 99 mg/dL Final    BUN 08/09/2022 17  7 - 25 mg/dL Final    Creatinine 08/09/2022 0.79  0.60 - 1.00 mg/dL Final    eGFR 08/09/2022 79  > OR = 60 mL/min/1.73m2 Final    BUN/Creatinine Ratio 08/09/2022 NOT  APPLICABLE  6 - 22 (calc) Final    Sodium 08/09/2022 139  135 - 146 mmol/L Final    Potassium 08/09/2022 4.5  3.5 - 5.3 mmol/L Final    Chloride 08/09/2022 103  98 - 110 mmol/L Final    CO2 08/09/2022 28  20 - 32 mmol/L Final    Calcium 08/09/2022 9.3  8.6 - 10.4 mg/dL Final    Total Protein 08/09/2022 7.4  6.1 - 8.1 g/dL Final    Albumin 08/09/2022 3.9  3.6 - 5.1 g/dL Final    Globulin, Total 08/09/2022 3.5  1.9 - 3.7 g/dL (calc) Final    Albumin/Globulin Ratio 08/09/2022 1.1  1.0 - 2.5 (calc) Final    Total Bilirubin 08/09/2022 0.4  0.2 - 1.2 mg/dL Final    Alkaline Phosphatase 08/09/2022 77  37 - 153 U/L Final    AST 08/09/2022 13  10 - 35 U/L Final    ALT 08/09/2022 11  6 - 29 U/L Final    Cholesterol 08/09/2022 223 (H)  <200 mg/dL Final    HDL 08/09/2022 70  > OR = 50 mg/dL Final    Triglycerides 08/09/2022 144  <150 mg/dL Final    LDL Cholesterol 08/09/2022 127 (H)  mg/dL (calc) Final    HDL/Cholesterol Ratio 08/09/2022 3.2  <5.0 (calc) Final    Non HDL Chol. (LDL+VLDL) 08/09/2022 153 (H)  <130 mg/dL (calc) Final    TSH w/reflex to FT4 08/09/2022 2.90  0.40 - 4.50 mIU/L Final       Past Medical History:   Diagnosis Date    Cholelithiasis      Social History     Socioeconomic History    Marital status: Single   Tobacco Use    Smoking status: Never    Smokeless tobacco: Never   Substance and Sexual Activity    Alcohol use: No    Drug use: No     Social Determinants of Health     Stress: No Stress Concern Present (9/12/2019)    [employer] - Occupational Stress Questionnaire     Feeling of Stress : Not at all     Past Surgical History:   Procedure Laterality Date    CHOLECYSTECTOMY  04/24/2017    Laparoscopic      Family History   Problem Relation Age of Onset    Melanoma Brother     Breast cancer Maternal Aunt         In her 30's       Review of patient's allergies indicates:   Allergen Reactions    Bactrim [sulfamethoxazole-trimethoprim] Palpitations and Other (See Comments)      "Chest pain       Current Outpatient Medications:     cholecalciferol, vitamin D3, 4,000 unit Cap, Vitamin D3 4,000 unit capsule  Take 1 capsule every day by oral route., Disp: , Rfl:     magnesium 30 mg Tab, Take by mouth Daily., Disp: , Rfl:     pantoprazole (PROTONIX) 40 MG tablet, Take 40 mg by mouth daily as needed., Disp: , Rfl:     potassium 99 mg Tab, Take by mouth once., Disp: , Rfl:     amLODIPine (NORVASC) 2.5 MG tablet, Take 1 tablet (2.5 mg total) by mouth once daily., Disp: 30 tablet, Rfl: 5    lisinopriL-hydrochlorothiazide (PRINZIDE,ZESTORETIC) 20-12.5 mg per tablet, Take 1 tablet by mouth once daily., Disp: 90 tablet, Rfl: 3    Review of Systems   Constitutional:  Negative for appetite change, chills, fatigue, fever and unexpected weight change.   HENT:  Negative for congestion.    Respiratory:  Negative for cough, chest tightness and shortness of breath.    Cardiovascular:  Negative for chest pain and palpitations.   Gastrointestinal:  Negative for abdominal distention and abdominal pain.   Endocrine: Negative for cold intolerance and heat intolerance.   Genitourinary:  Negative for difficulty urinating and dysuria.   Musculoskeletal:  Negative for arthralgias and back pain.   Neurological:  Negative for dizziness, weakness and headaches.          Objective:      Vitals:    08/22/23 1022 08/22/23 1026   BP: (!) 132/94 (!) 132/94   Pulse: (!) 56    SpO2: 97%    Weight: 60.8 kg (134 lb)    Height: 4' 10.5" (1.486 m)    PF: (!) 8 L/min      Physical Exam  Constitutional:       General: She is not in acute distress.     Appearance: She is well-developed.   HENT:      Head: Normocephalic and atraumatic.   Eyes:      Conjunctiva/sclera: Conjunctivae normal.      Pupils: Pupils are equal, round, and reactive to light.   Neck:      Thyroid: No thyromegaly.   Cardiovascular:      Rate and Rhythm: Normal rate and regular rhythm.      Pulses: Normal pulses.      Heart sounds: Normal heart sounds.   Pulmonary: "      Effort: Pulmonary effort is normal.      Breath sounds: Normal breath sounds. No wheezing or rales.   Abdominal:      General: Bowel sounds are normal. There is no distension.      Palpations: Abdomen is soft.      Tenderness: There is no abdominal tenderness.   Musculoskeletal:         General: Normal range of motion.      Cervical back: Normal range of motion and neck supple.      Right lower leg: No edema.      Left lower leg: No edema.   Skin:     General: Skin is warm and dry.      Findings: No erythema.      Comments: Varicose veins noted to the bilateral lower extremities.   Neurological:      Mental Status: She is alert and oriented to person, place, and time.      Cranial Nerves: No cranial nerve deficit.           Assessment:       1. Essential hypertension    2. Varicose veins of left leg with edema         Plan:       Essential hypertension  Comments:  BP has been running high. She is agreeable to low dose amlodipne. will have her back in 2 weeks for a nurse check to f/u.  Orders:  -     amLODIPine (NORVASC) 2.5 MG tablet; Take 1 tablet (2.5 mg total) by mouth once daily.  Dispense: 30 tablet; Refill: 5  -     lisinopriL-hydrochlorothiazide (PRINZIDE,ZESTORETIC) 20-12.5 mg per tablet; Take 1 tablet by mouth once daily.  Dispense: 90 tablet; Refill: 3    Varicose veins of left leg with edema  Comments:  has had u/s that is normal. checking the back through CT to evaluate further and see if claudication related to spine.      Follow up in about 3 months (around 11/22/2023).        8/22/2023 Tylor Brewer

## 2023-09-05 ENCOUNTER — CLINICAL SUPPORT (OUTPATIENT)
Dept: FAMILY MEDICINE | Facility: CLINIC | Age: 73
End: 2023-09-05
Payer: MEDICARE

## 2023-09-05 VITALS — HEART RATE: 72 BPM | DIASTOLIC BLOOD PRESSURE: 80 MMHG | SYSTOLIC BLOOD PRESSURE: 122 MMHG

## 2023-09-05 DIAGNOSIS — I10 ESSENTIAL HYPERTENSION: Primary | ICD-10-CM

## 2023-09-05 NOTE — PROGRESS NOTES
Pt here for [place] bp check. Denies taking BP at home. No home cuff. BP today normal. Advised to keep scheduled appt. Meds on list verified.

## 2023-09-20 DIAGNOSIS — Z78.0 ASYMPTOMATIC MENOPAUSAL STATE: ICD-10-CM

## 2023-11-13 ENCOUNTER — OFFICE VISIT (OUTPATIENT)
Dept: FAMILY MEDICINE | Facility: CLINIC | Age: 73
End: 2023-11-13
Payer: MEDICARE

## 2023-11-13 VITALS
HEART RATE: 74 BPM | SYSTOLIC BLOOD PRESSURE: 110 MMHG | OXYGEN SATURATION: 98 % | WEIGHT: 130.19 LBS | DIASTOLIC BLOOD PRESSURE: 68 MMHG | BODY MASS INDEX: 26.24 KG/M2 | HEIGHT: 59 IN

## 2023-11-13 DIAGNOSIS — I10 ESSENTIAL HYPERTENSION: Primary | ICD-10-CM

## 2023-11-13 DIAGNOSIS — I83.892 VARICOSE VEINS OF LEFT LEG WITH EDEMA: ICD-10-CM

## 2023-11-13 PROCEDURE — 99214 OFFICE O/P EST MOD 30 MIN: CPT | Mod: S$GLB,,, | Performed by: PHYSICIAN ASSISTANT

## 2023-11-13 PROCEDURE — 99214 PR OFFICE/OUTPT VISIT, EST, LEVL IV, 30-39 MIN: ICD-10-PCS | Mod: S$GLB,,, | Performed by: PHYSICIAN ASSISTANT

## 2023-11-13 NOTE — PROGRESS NOTES
SUBJECTIVE:    Patient ID: Lina Ventura is a 72 y.o. female.    Chief Complaint: Follow-up (No bottles//Pt here for 3 mo follow up//declines flu vacc//JL)    This is a 72-year-old female who presents today for regular three-month follow-up. She reports that she has been doing better overall. Watching her pressure closely at home. Occasional dizziness reported but she is tolerating ok. She has no new concerns at this time.        No visits with results within 6 Month(s) from this visit.   Latest known visit with results is:   Office Visit on 08/09/2022   Component Date Value Ref Range Status    WBC 08/09/2022 5.6  3.8 - 10.8 Thousand/uL Final    RBC 08/09/2022 5.08  3.80 - 5.10 Million/uL Final    Hemoglobin 08/09/2022 14.5  11.7 - 15.5 g/dL Final    Hematocrit 08/09/2022 44.9  35.0 - 45.0 % Final    MCV 08/09/2022 88.4  80.0 - 100.0 fL Final    MCH 08/09/2022 28.5  27.0 - 33.0 pg Final    MCHC 08/09/2022 32.3  32.0 - 36.0 g/dL Final    RDW 08/09/2022 14.1  11.0 - 15.0 % Final    Platelets 08/09/2022 239  140 - 400 Thousand/uL Final    MPV 08/09/2022 11.3  7.5 - 12.5 fL Final    Neutrophils, Abs 08/09/2022 3,584  1,500 - 7,800 cells/uL Final    Lymph # 08/09/2022 1,378  850 - 3,900 cells/uL Final    Mono # 08/09/2022 392  200 - 950 cells/uL Final    Eos # 08/09/2022 207  15 - 500 cells/uL Final    Baso # 08/09/2022 39  0 - 200 cells/uL Final    Neutrophils Relative 08/09/2022 64  % Final    Lymph % 08/09/2022 24.6  % Final    Mono % 08/09/2022 7.0  % Final    Eosinophil % 08/09/2022 3.7  % Final    Basophil % 08/09/2022 0.7  % Final    Glucose 08/09/2022 96  65 - 99 mg/dL Final    BUN 08/09/2022 17  7 - 25 mg/dL Final    Creatinine 08/09/2022 0.79  0.60 - 1.00 mg/dL Final    eGFR 08/09/2022 79  > OR = 60 mL/min/1.73m2 Final    BUN/Creatinine Ratio 08/09/2022 NOT APPLICABLE  6 - 22 (calc) Final    Sodium 08/09/2022 139  135 - 146 mmol/L Final    Potassium 08/09/2022 4.5  3.5 - 5.3 mmol/L Final    Chloride  08/09/2022 103  98 - 110 mmol/L Final    CO2 08/09/2022 28  20 - 32 mmol/L Final    Calcium 08/09/2022 9.3  8.6 - 10.4 mg/dL Final    Total Protein 08/09/2022 7.4  6.1 - 8.1 g/dL Final    Albumin 08/09/2022 3.9  3.6 - 5.1 g/dL Final    Globulin, Total 08/09/2022 3.5  1.9 - 3.7 g/dL (calc) Final    Albumin/Globulin Ratio 08/09/2022 1.1  1.0 - 2.5 (calc) Final    Total Bilirubin 08/09/2022 0.4  0.2 - 1.2 mg/dL Final    Alkaline Phosphatase 08/09/2022 77  37 - 153 U/L Final    AST 08/09/2022 13  10 - 35 U/L Final    ALT 08/09/2022 11  6 - 29 U/L Final    Cholesterol 08/09/2022 223 (H)  <200 mg/dL Final    HDL 08/09/2022 70  > OR = 50 mg/dL Final    Triglycerides 08/09/2022 144  <150 mg/dL Final    LDL Cholesterol 08/09/2022 127 (H)  mg/dL (calc) Final    HDL/Cholesterol Ratio 08/09/2022 3.2  <5.0 (calc) Final    Non HDL Chol. (LDL+VLDL) 08/09/2022 153 (H)  <130 mg/dL (calc) Final    TSH w/reflex to FT4 08/09/2022 2.90  0.40 - 4.50 mIU/L Final       Past Medical History:   Diagnosis Date    Cholelithiasis      Past Surgical History:   Procedure Laterality Date    CHOLECYSTECTOMY  04/24/2017    Laparoscopic      Family History   Problem Relation Age of Onset    Melanoma Brother     Breast cancer Maternal Aunt         In her 30's       Marital Status: Single  Alcohol History:  reports no history of alcohol use.  Tobacco History:  reports that she has never smoked. She has never used smokeless tobacco.  Drug History:  reports no history of drug use.    Review of patient's allergies indicates:   Allergen Reactions    Bactrim [sulfamethoxazole-trimethoprim] Palpitations and Other (See Comments)     Chest pain       Current Outpatient Medications:     amLODIPine (NORVASC) 2.5 MG tablet, Take 1 tablet (2.5 mg total) by mouth once daily., Disp: 30 tablet, Rfl: 5    cholecalciferol, vitamin D3, 4,000 unit Cap, Vitamin D3 4,000 unit capsule  Take 1 capsule every day by oral route., Disp: , Rfl:     fluticasone propionate  "(FLONASE) 50 mcg/actuation nasal spray, INSTILL 1 SPRAY IN EACH NOSTRIL TWICE A DAY FOR ALLERGIES, Disp: , Rfl:     lisinopriL-hydrochlorothiazide (PRINZIDE,ZESTORETIC) 20-12.5 mg per tablet, Take 1 tablet by mouth once daily., Disp: 90 tablet, Rfl: 3    loratadine (CLARITIN) 10 mg tablet, TAKE ONE TABLET BY MOUTH ONCE DAILY FOR ALLERGIC RHINITIS, Disp: , Rfl:     magnesium 30 mg Tab, Take by mouth Daily., Disp: , Rfl:     pantoprazole (PROTONIX) 40 MG tablet, Take 40 mg by mouth daily as needed., Disp: , Rfl:     potassium 99 mg Tab, Take by mouth once., Disp: , Rfl:     Review of Systems   Constitutional:  Negative for appetite change, chills, fatigue, fever and unexpected weight change.   HENT:  Negative for congestion.    Respiratory:  Negative for cough, chest tightness and shortness of breath.    Cardiovascular:  Negative for chest pain and palpitations.   Gastrointestinal:  Negative for abdominal distention and abdominal pain.   Endocrine: Negative for cold intolerance and heat intolerance.   Genitourinary:  Negative for difficulty urinating and dysuria.   Musculoskeletal:  Negative for arthralgias and back pain.   Neurological:  Negative for dizziness, weakness and headaches.          Objective:      Vitals:    11/13/23 1145   BP: 110/68   Pulse: 74   SpO2: 98%   Weight: 59.1 kg (130 lb 3.2 oz)   Height: 4' 10.5" (1.486 m)     Physical Exam  Constitutional:       General: She is not in acute distress.     Appearance: She is well-developed.   HENT:      Head: Normocephalic and atraumatic.   Eyes:      Conjunctiva/sclera: Conjunctivae normal.      Pupils: Pupils are equal, round, and reactive to light.   Neck:      Thyroid: No thyromegaly.   Cardiovascular:      Rate and Rhythm: Normal rate and regular rhythm.      Pulses: Normal pulses.      Heart sounds: Normal heart sounds.   Pulmonary:      Effort: Pulmonary effort is normal.      Breath sounds: Normal breath sounds. No wheezing or rales.   Abdominal:      " General: Bowel sounds are normal. There is no distension.      Palpations: Abdomen is soft.      Tenderness: There is no abdominal tenderness.   Musculoskeletal:         General: Normal range of motion.      Cervical back: Normal range of motion and neck supple.      Right lower leg: No edema.      Left lower leg: No edema.   Skin:     General: Skin is warm and dry.      Findings: No erythema.      Comments: Varicose veins noted to the bilateral lower extremities.   Neurological:      Mental Status: She is alert and oriented to person, place, and time.      Cranial Nerves: No cranial nerve deficit.           Assessment:       1. Essential hypertension    2. Varicose veins of left leg with edema         Plan:       Essential hypertension  Comments:  BP looks great! will maintain as is.    Varicose veins of left leg with edema  Comments:  Met with vascular surgeon. Had ultrasound completed. valves/flow is good. does not wish any further workup. not bothersome.      Follow up in about 6 months (around 5/13/2024) for Annual Physical.        11/13/2023 Tylor Brewer PA-C

## 2024-05-27 ENCOUNTER — TELEPHONE (OUTPATIENT)
Dept: FAMILY MEDICINE | Facility: CLINIC | Age: 74
End: 2024-05-27
Payer: MEDICARE

## 2024-06-13 ENCOUNTER — OFFICE VISIT (OUTPATIENT)
Dept: FAMILY MEDICINE | Facility: CLINIC | Age: 74
End: 2024-06-13
Payer: MEDICARE

## 2024-06-13 VITALS
HEIGHT: 59 IN | OXYGEN SATURATION: 97 % | WEIGHT: 133 LBS | BODY MASS INDEX: 26.81 KG/M2 | HEART RATE: 78 BPM | RESPIRATION RATE: 18 BRPM | DIASTOLIC BLOOD PRESSURE: 65 MMHG | SYSTOLIC BLOOD PRESSURE: 136 MMHG

## 2024-06-13 DIAGNOSIS — I10 ESSENTIAL HYPERTENSION: Primary | ICD-10-CM

## 2024-06-13 DIAGNOSIS — Z79.899 ENCOUNTER FOR LONG-TERM (CURRENT) USE OF OTHER MEDICATIONS: ICD-10-CM

## 2024-06-13 PROCEDURE — 99214 OFFICE O/P EST MOD 30 MIN: CPT | Mod: S$GLB,,, | Performed by: PHYSICIAN ASSISTANT

## 2024-06-13 RX ORDER — LISINOPRIL AND HYDROCHLOROTHIAZIDE 12.5; 2 MG/1; MG/1
1 TABLET ORAL DAILY
Qty: 90 TABLET | Refills: 3 | Status: SHIPPED | OUTPATIENT
Start: 2024-06-13 | End: 2025-06-13

## 2024-06-13 RX ORDER — AMLODIPINE BESYLATE 2.5 MG/1
2.5 TABLET ORAL DAILY
Qty: 90 TABLET | Refills: 3 | Status: SHIPPED | OUTPATIENT
Start: 2024-06-13 | End: 2025-06-13

## 2024-06-13 NOTE — PROGRESS NOTES
SUBJECTIVE:    Patient ID: Lina Ventura is a 73 y.o. female.    Chief Complaint: Follow-up (No bottles// refills needed// pt states she have no complaints today //pt refused mammogram until November )    73 year old female with a history of hypertension presents for a follow up. She has been doing well. Taking medications as prescribed. Blood pressure has been good, will occasionally fluctuate. She has no new complaints.     She is planning on going to the ophthalmologist later this year. She is getting her mammogram every 2 years now, so planning on April 2025.     Follow-up  Pertinent negatives include no abdominal pain, arthralgias, chest pain, chills, congestion, coughing, fatigue, fever, headaches or weakness.       No visits with results within 6 Month(s) from this visit.   Latest known visit with results is:   Office Visit on 08/09/2022   Component Date Value Ref Range Status    WBC 08/09/2022 5.6  3.8 - 10.8 Thousand/uL Final    RBC 08/09/2022 5.08  3.80 - 5.10 Million/uL Final    Hemoglobin 08/09/2022 14.5  11.7 - 15.5 g/dL Final    Hematocrit 08/09/2022 44.9  35.0 - 45.0 % Final    MCV 08/09/2022 88.4  80.0 - 100.0 fL Final    MCH 08/09/2022 28.5  27.0 - 33.0 pg Final    MCHC 08/09/2022 32.3  32.0 - 36.0 g/dL Final    RDW 08/09/2022 14.1  11.0 - 15.0 % Final    Platelets 08/09/2022 239  140 - 400 Thousand/uL Final    MPV 08/09/2022 11.3  7.5 - 12.5 fL Final    Neutrophils, Abs 08/09/2022 3,584  1,500 - 7,800 cells/uL Final    Lymph # 08/09/2022 1,378  850 - 3,900 cells/uL Final    Mono # 08/09/2022 392  200 - 950 cells/uL Final    Eos # 08/09/2022 207  15 - 500 cells/uL Final    Baso # 08/09/2022 39  0 - 200 cells/uL Final    Neutrophils Relative 08/09/2022 64  % Final    Lymph % 08/09/2022 24.6  % Final    Mono % 08/09/2022 7.0  % Final    Eosinophil % 08/09/2022 3.7  % Final    Basophil % 08/09/2022 0.7  % Final    Glucose 08/09/2022 96  65 - 99 mg/dL Final    BUN 08/09/2022 17  7 - 25 mg/dL Final     Creatinine 08/09/2022 0.79  0.60 - 1.00 mg/dL Final    eGFR 08/09/2022 79  > OR = 60 mL/min/1.73m2 Final    BUN/Creatinine Ratio 08/09/2022 NOT APPLICABLE  6 - 22 (calc) Final    Sodium 08/09/2022 139  135 - 146 mmol/L Final    Potassium 08/09/2022 4.5  3.5 - 5.3 mmol/L Final    Chloride 08/09/2022 103  98 - 110 mmol/L Final    CO2 08/09/2022 28  20 - 32 mmol/L Final    Calcium 08/09/2022 9.3  8.6 - 10.4 mg/dL Final    Total Protein 08/09/2022 7.4  6.1 - 8.1 g/dL Final    Albumin 08/09/2022 3.9  3.6 - 5.1 g/dL Final    Globulin, Total 08/09/2022 3.5  1.9 - 3.7 g/dL (calc) Final    Albumin/Globulin Ratio 08/09/2022 1.1  1.0 - 2.5 (calc) Final    Total Bilirubin 08/09/2022 0.4  0.2 - 1.2 mg/dL Final    Alkaline Phosphatase 08/09/2022 77  37 - 153 U/L Final    AST 08/09/2022 13  10 - 35 U/L Final    ALT 08/09/2022 11  6 - 29 U/L Final    Cholesterol 08/09/2022 223 (H)  <200 mg/dL Final    HDL 08/09/2022 70  > OR = 50 mg/dL Final    Triglycerides 08/09/2022 144  <150 mg/dL Final    LDL Cholesterol 08/09/2022 127 (H)  mg/dL (calc) Final    HDL/Cholesterol Ratio 08/09/2022 3.2  <5.0 (calc) Final    Non HDL Chol. (LDL+VLDL) 08/09/2022 153 (H)  <130 mg/dL (calc) Final    TSH w/reflex to FT4 08/09/2022 2.90  0.40 - 4.50 mIU/L Final       Past Medical History:   Diagnosis Date    Cholelithiasis      Past Surgical History:   Procedure Laterality Date    CHOLECYSTECTOMY  04/24/2017    Laparoscopic      Family History   Problem Relation Name Age of Onset    Melanoma Brother      Breast cancer Maternal Aunt          In her 30's       Marital Status: Single  Alcohol History:  reports no history of alcohol use.  Tobacco History:  reports that she has never smoked. She has never used smokeless tobacco.  Drug History:  reports no history of drug use.    Review of patient's allergies indicates:   Allergen Reactions    Bactrim [sulfamethoxazole-trimethoprim] Palpitations and Other (See Comments)     Chest pain       Current Outpatient  "Medications:     cholecalciferol, vitamin D3, 4,000 unit Cap, Vitamin D3 4,000 unit capsule  Take 1 capsule every day by oral route., Disp: , Rfl:     loratadine (CLARITIN) 10 mg tablet, TAKE ONE TABLET BY MOUTH ONCE DAILY FOR ALLERGIC RHINITIS, Disp: , Rfl:     magnesium 30 mg Tab, Take by mouth Daily., Disp: , Rfl:     potassium 99 mg Tab, Take by mouth once., Disp: , Rfl:     amLODIPine (NORVASC) 2.5 MG tablet, Take 1 tablet (2.5 mg total) by mouth once daily., Disp: 90 tablet, Rfl: 3    lisinopriL-hydrochlorothiazide (PRINZIDE,ZESTORETIC) 20-12.5 mg per tablet, Take 1 tablet by mouth once daily., Disp: 90 tablet, Rfl: 3    pantoprazole (PROTONIX) 40 MG tablet, Take 40 mg by mouth daily as needed. (Patient not taking: Reported on 6/13/2024), Disp: , Rfl:     Review of Systems   Constitutional:  Negative for appetite change, chills, fatigue, fever and unexpected weight change.   HENT:  Negative for congestion.    Respiratory:  Negative for cough, chest tightness and shortness of breath.    Cardiovascular:  Negative for chest pain and palpitations.   Gastrointestinal:  Negative for abdominal distention and abdominal pain.   Endocrine: Negative for cold intolerance and heat intolerance.   Genitourinary:  Negative for difficulty urinating and dysuria.   Musculoskeletal:  Negative for arthralgias and back pain.   Neurological:  Negative for dizziness, weakness and headaches.          Objective:      Vitals:    06/13/24 0747 06/13/24 0749   BP: (!) 141/62 136/65   Pulse: 78    Resp: 18    SpO2: 97%    Weight: 60.3 kg (133 lb)    Height: 4' 10.5" (1.486 m)      Physical Exam  Constitutional:       General: She is not in acute distress.     Appearance: Normal appearance.   HENT:      Head: Normocephalic and atraumatic.   Cardiovascular:      Rate and Rhythm: Normal rate and regular rhythm.      Heart sounds: Normal heart sounds.   Pulmonary:      Effort: Pulmonary effort is normal.      Breath sounds: Normal breath " sounds.   Abdominal:      General: Abdomen is flat. There is no distension.   Musculoskeletal:         General: Normal range of motion.      Cervical back: Normal range of motion.   Skin:     General: Skin is warm and dry.   Neurological:      Mental Status: She is alert and oriented to person, place, and time.           Assessment:       1. Essential hypertension    2. Encounter for long-term (current) use of other medications           Plan:       Essential hypertension  Comments:  continue as is.  Orders:  -     amLODIPine (NORVASC) 2.5 MG tablet; Take 1 tablet (2.5 mg total) by mouth once daily.  Dispense: 90 tablet; Refill: 3  -     lisinopriL-hydrochlorothiazide (PRINZIDE,ZESTORETIC) 20-12.5 mg per tablet; Take 1 tablet by mouth once daily.  Dispense: 90 tablet; Refill: 3    Encounter for long-term (current) use of other medications  Comments:  labs today for ongoing pt care.  Orders:  -     CBC W/ AUTO DIFFERENTIAL; Future; Expected date: 06/13/2024  -     COMPREHENSIVE METABOLIC PANEL; Future; Expected date: 06/13/2024  -     Lipid Panel; Future; Expected date: 06/13/2024  -     TSH; Future; Expected date: 06/13/2024  -     Microalbumin/creatinine urine ratio  -     Urinalysis, Reflex to Urine Culture Urine, Clean Catch; Future; Expected date: 06/13/2024  -     Magnesium; Future; Expected date: 06/13/2024      Problem List Items Addressed This Visit    None  Visit Diagnoses       Essential hypertension    -  Primary    continue as is.    Relevant Medications    amLODIPine (NORVASC) 2.5 MG tablet    lisinopriL-hydrochlorothiazide (PRINZIDE,ZESTORETIC) 20-12.5 mg per tablet    Encounter for long-term (current) use of other medications        labs today for ongoing pt care.    Relevant Orders    CBC W/ AUTO DIFFERENTIAL    COMPREHENSIVE METABOLIC PANEL    Lipid Panel    TSH    Microalbumin/creatinine urine ratio    Urinalysis, Reflex to Urine Culture Urine, Clean Catch    Magnesium          Follow up in about 1  year (around 6/13/2025) for Annual Physical.        6/13/2024 Tylor Brewer PA-C

## 2024-06-14 LAB
ALBUMIN SERPL-MCNC: 4 G/DL (ref 3.6–5.1)
ALBUMIN/GLOB SERPL: 1.2 (CALC) (ref 1–2.5)
ALP SERPL-CCNC: 87 U/L (ref 37–153)
ALT SERPL-CCNC: 13 U/L (ref 6–29)
AST SERPL-CCNC: 16 U/L (ref 10–35)
BASOPHILS # BLD AUTO: 20 CELLS/UL (ref 0–200)
BASOPHILS NFR BLD AUTO: 0.4 %
BILIRUB SERPL-MCNC: 0.7 MG/DL (ref 0.2–1.2)
BUN SERPL-MCNC: 19 MG/DL (ref 7–25)
BUN/CREAT SERPL: ABNORMAL (CALC) (ref 6–22)
CALCIUM SERPL-MCNC: 9 MG/DL (ref 8.6–10.4)
CHLORIDE SERPL-SCNC: 99 MMOL/L (ref 98–110)
CHOLEST SERPL-MCNC: 197 MG/DL
CHOLEST/HDLC SERPL: 3.3 (CALC)
CO2 SERPL-SCNC: 29 MMOL/L (ref 20–32)
CREAT SERPL-MCNC: 0.85 MG/DL (ref 0.6–1)
EGFR: 72 ML/MIN/1.73M2
EOSINOPHIL # BLD AUTO: 92 CELLS/UL (ref 15–500)
EOSINOPHIL NFR BLD AUTO: 1.8 %
ERYTHROCYTE [DISTWIDTH] IN BLOOD BY AUTOMATED COUNT: 13.9 % (ref 11–15)
GLOBULIN SER CALC-MCNC: 3.4 G/DL (CALC) (ref 1.9–3.7)
GLUCOSE SERPL-MCNC: 115 MG/DL (ref 65–99)
HCT VFR BLD AUTO: 46.4 % (ref 35–45)
HDLC SERPL-MCNC: 60 MG/DL
HGB BLD-MCNC: 14.7 G/DL (ref 11.7–15.5)
LDLC SERPL CALC-MCNC: 115 MG/DL (CALC)
LYMPHOCYTES # BLD AUTO: 1403 CELLS/UL (ref 850–3900)
LYMPHOCYTES NFR BLD AUTO: 27.5 %
MAGNESIUM SERPL-MCNC: 2.2 MG/DL (ref 1.5–2.5)
MCH RBC QN AUTO: 28.3 PG (ref 27–33)
MCHC RBC AUTO-ENTMCNC: 31.7 G/DL (ref 32–36)
MCV RBC AUTO: 89.2 FL (ref 80–100)
MONOCYTES # BLD AUTO: 510 CELLS/UL (ref 200–950)
MONOCYTES NFR BLD AUTO: 10 %
NEUTROPHILS # BLD AUTO: 3075 CELLS/UL (ref 1500–7800)
NEUTROPHILS NFR BLD AUTO: 60.3 %
NONHDLC SERPL-MCNC: 137 MG/DL (CALC)
PLATELET # BLD AUTO: 257 THOUSAND/UL (ref 140–400)
PMV BLD REES-ECKER: 11 FL (ref 7.5–12.5)
POTASSIUM SERPL-SCNC: 4.2 MMOL/L (ref 3.5–5.3)
PROT SERPL-MCNC: 7.4 G/DL (ref 6.1–8.1)
RBC # BLD AUTO: 5.2 MILLION/UL (ref 3.8–5.1)
SODIUM SERPL-SCNC: 137 MMOL/L (ref 135–146)
TRIGL SERPL-MCNC: 111 MG/DL
TSH SERPL-ACNC: 3.23 MIU/L (ref 0.4–4.5)
WBC # BLD AUTO: 5.1 THOUSAND/UL (ref 3.8–10.8)

## 2024-06-24 ENCOUNTER — TELEPHONE (OUTPATIENT)
Dept: FAMILY MEDICINE | Facility: CLINIC | Age: 74
End: 2024-06-24
Payer: MEDICARE

## 2024-06-24 NOTE — TELEPHONE ENCOUNTER
----- Message from Tylor Brewer PA-C sent at 6/24/2024  2:46 PM CDT -----  Labs overall look fairly stable at this time.  Continue as is and let me know if you have any further questions.  We can review more in depth at office visit in person

## 2024-09-25 DIAGNOSIS — Z78.0 MENOPAUSE: ICD-10-CM

## 2025-05-19 ENCOUNTER — TELEPHONE (OUTPATIENT)
Dept: SPEECH THERAPY | Facility: HOSPITAL | Age: 75
End: 2025-05-19
Payer: MEDICARE

## 2025-05-22 ENCOUNTER — TELEPHONE (OUTPATIENT)
Dept: FAMILY MEDICINE | Facility: CLINIC | Age: 75
End: 2025-05-22
Payer: MEDICARE

## 2025-05-22 DIAGNOSIS — Z79.899 ENCOUNTER FOR LONG-TERM (CURRENT) USE OF MEDICATIONS: ICD-10-CM

## 2025-05-22 DIAGNOSIS — I10 ESSENTIAL HYPERTENSION: Primary | ICD-10-CM

## 2025-05-22 DIAGNOSIS — Z00.00 ANNUAL PHYSICAL EXAM: ICD-10-CM

## 2025-07-01 ENCOUNTER — CLINICAL SUPPORT (OUTPATIENT)
Dept: REHABILITATION | Facility: HOSPITAL | Age: 75
End: 2025-07-01
Payer: MEDICARE

## 2025-07-01 DIAGNOSIS — R49.0 HOARSE VOICE QUALITY: Primary | ICD-10-CM

## 2025-07-01 DIAGNOSIS — J38.1 VOCAL CORD POLYPS: ICD-10-CM

## 2025-07-01 PROCEDURE — 92524 BEHAVRAL QUALIT ANALYS VOICE: CPT

## 2025-07-01 PROCEDURE — 92507 TX SP LANG VOICE COMM INDIV: CPT

## 2025-07-01 NOTE — LETTER
July 2, 2025  Alvin Jenkins MD  [address]  [address]  [place] [address]      To whom it may concern,     The attached plan of care is being sent to you for review and reference.    You may indicate your approval by signing the document electronically, or by faxing/mailing a signed copy of the final page of this document back to the attention of Liseth Jackson CCC-SLP:         Plan of Care 7/2/25   Effective from: 7/2/2025  Effective to: 8/27/2025    Plan ID: [id number]            Participants as of Finalize on 7/2/2025    Name Type Comments Contact Info    Alvin Jenkins MD Referring Provider  [phone]    Liseth Jackson CCC-SLP Speech Language Pathologist         Last Plan Note     Author: Liseth Jackson CCC-SLP Status: Signed Last edited: 7/1/2025  2:15 PM         Outpatient Rehab    Speech-Language Pathology Evaluation (only)    Patient Name: Lina Ventura  MRN: [id number]  YOB: 1950  Encounter Date: 7/1/2025    Therapy Diagnosis:   Encounter Diagnoses   Name Primary?    Hoarse voice quality Yes    Vocal cord polyps      Physician: Alvin Jenkins MD    Physician Orders: Eval and Treat  Medical Diagnosis: Hoarse  Oral phase dysphagia  Vocal cord polyps  Surgical Diagnosis: Not applicable for this Episode   Surgical Date: Not applicable for this Episode  Days Since Last Surgery: Not applicable for this Episode    Visit # / Visits Authorized: 1 / 1   Insurance Authorization Period: 5/14/2025 to 5/14/2026  Date of Evaluation: 7/1/2025      Time In: 1415   Time Out: 1500  Total Time (in minutes): 45   Total Billable Time (in minutes): 45      Subjective   History of Present Illness  Lina is a 74 y.o. female who reports to Speech-Language Pathology with a chief concern of Patient presents with persistent hoarseness and vocal fatigue. ENT evaluation revealed vocal cord polyps. Patient reports decreased vocal quality, vocal strain with prolonged speaking, and reduced  "ability to project voice, impacting daily communication demands..     The patient reports a medical diagnosis of Hoarse (R49.0), Oral phase dysphagia (R13.11), Vocal cord polyps (J38.1).    Diagnostic tests related to this condition: Laryngoscopy.   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  Mobility normal  Subglottis: normal  Hypopharynx  Piriform sinus: normal  Postcricoid region: normal; edema"    Patient presents in clinic breathing with Room air.             Prior Level of Function: Independent, no vocal fatiuge or difficulty sustaining adequate vocal quality to meet conversational needs throughout the day  Current Level of Function: Independent, however, patient has persistent hoarseness and vocal fatigue, decreased vocal quality, vocal strain with prolonged speaking, and reduced ability to project voice, impacting daily communication demands. She expresses frustration with current vocal quality and how it has affected her ability to participate within her social ADL's (talking to friends on the phone, caring for grandchildren, etc.)    Lina is a 74 y.o. female who presents to [facility] with a long-standing history of dysphonia characterized by hoarseness, vocal fatigue, and reduced vocal endurance. She reports that symptoms began approximately 5-6 years ago with intermittent raspiness, lower pitch, and fluctuating voice quality throughout the day. However, as of January/February 2025, symptoms have become more persistent and now affect her voice throughout the entire day, worsening with vocal use. ENT evaluation identified vocal nodules (confirmed on " "laryngoscopy in May) and suspected laryngopharyngeal reflux (LPR) as a contributing factor. Patient restarted reflux medication in March, having previously been on similar treatment 5-6 years ago. Despite this, she reports no significant improvement in symptoms and perceives worsening voice quality. She describes increasing dryness, particularly at night, and a sensation of needing to "pull from the center of her chest" to initiate voice. She attempts to conserve her voice by limiting phone calls to the morning and reports vocal strain often leads to fatigue and occasional headaches. Patient denies formal vocal hygiene education and currently uses saltwater gargles as a self-management strategy. She reports occasional sensation of food "getting stuck" in her throat and is scheduled to follow up with a gastroenterologist on the 16th. Past medical history includes seasonal allergies, previously severe coughing episodes, nasal polyps, and a deviated septum. Currently, coughing has decreased with reflux treatment. Patient is active in her community, involved in Congregational activities and planning high school reunions. She is frequently with her grandchildren and her two adult sons live with her. She reports that her vocal difficulties negatively impact her participation in these roles and her overall quality of life.     Current Speech Symptoms  Patient reports: Breathiness, Hoarseness, and Vocal Quality Changes         Voice Problem and History  Voice Problem Impact: Voice problem impacts her ability to activly participate in meaningful conversations throughout her day  Patient's Voice Problem Description: Breathy, Hoarse, Strained, Voice breaks/cracks, Pitch instability  Voice Problem Consistency: Constant, Worse with prolonged use     Voice-Relevant Medical History: Gastroesophageal reflux     Previous Voice Therapy: Medication, Limit voice use  Use of Voice: Frequent telephone use, Speaking in groups  Voice Hygiene " Factors: Dehydration            Treatment History       No: Previously Received Treatments        No: Currently Receiving Treatments               Living Arrangements  Living Situation  Housing: Home independently  Living Arrangements: Children  Support Systems: Children, Mandaeism/kimberly community         Employment  Patient does not report that: Does the patient's condition impact their ability to work?  Employment Status: Retired          Past Medical History/Physical Systems Review:   Lina Ventura  has a past medical history of Cholelithiasis.    Lina Ventura  has a past surgical history that includes Cholecystectomy (04/24/2017).    Lina has a current medication list which includes the following prescription(s): amlodipine, cholecalciferol (vitamin d3), lisinopril-hydrochlorothiazide, loratadine, magnesium, pantoprazole, and potassium.    Review of patient's allergies indicates:   Allergen Reactions    Bactrim [sulfamethoxazole-trimethoprim] Palpitations and Other (See Comments)     Chest pain        Objective   Oral Peripheral Exam  Dentition and Oral Hygiene      Oral hygiene is Good.       Labial Exam  Labial Symmetry is Normal.        Tone is Normal. Coordination is Intact.     Seal is Normal.       Lingual Exam      Symmetry-CN XII-is Normal. Tone is Normal. Speed is Intact. Coordination is Intact.            Jaw Exam      Strength-CN V-is Normal. Tone is Normal.   Jaw Locking: No        Communication Modes and Devices     Patient Expressive Communication Modes: Verbal  Verbalizations: Phrases/sentences    Current Receptive Communication Modes: Auditory  Caregiver/Familiar Person Required to Support Communication: No            Vocal Characteristics  Activity: Phonation  Phonation Quality Characteristics: Hoarse, Strained  Phonation Loudness: Normal     Activity: Oral Reading  Oral Reading Quality Characteristics: Strained, Hoarse, Breathy, Pitch breaks  Oral Reading Loudness: Normal     Activity:  Conversation  Conversation Quality Characteristics: Hoarse, Strained  Conversation Loudness: Normal    Volition  Volitional Cough: Normal   Volitional Swallow: Present      Motor Speech  Oral Agility  Oral Diadochokinesis     Speech Rate  Speech Rate: Normal       Intelligibility and Articulation  Intelligibility  Intact: Sentence-Level and Conversation-Level      Articulation  Speech Articulation: Normal        Motor Speech Impairment Type  Apraxia  Apraxia Type: None       Dysarthria  Dysarthria Type: None               Time Entry(in minutes):  Behavioral And Qualitative Voice/Resonance Analysis Time Entry: 30  Speech Treatment (Individual) Time Entry: 15    Assessment & Plan   Assessment   Lina presents with symptoms that are Stable.  Will Comorbidities Impact Care: No       Diagnosis and Impressions: Patient presents with chronic dysphonia characterized by hoarseness, strain, breathiness, and vocal fatigue, likely multifactorial in nature. Contributing factors appear to include underlying laryngopharyngeal reflux, vocal nodules, inefficient vocal habits, and possible compensatory hyperfunction. During evaluation, voice quality was noted to be hoarse, strained, breathy, and intermittently strangled, particularly during connected speech and oral reading tasks. Patient reports vocal symptoms are worsening despite recent initiation of reflux management, and describes significant impact on daily communication and participation in social roles. Patient demonstrated stimulability for semi-occluded vocal tract (SOVT) exercises, indicating potential for improved vocal efficiency with direct voice therapy. Vocal hygiene education was provided; patient demonstrated good insight and commitment to behavior change, including eliminating menthol cough drops, reducing caffeine intake, increasing water consumption, and initiating use of a humidifier. 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 The patient's learning style includes Demonstration, Listening, and Pictures/video. The patient Demonstrates understanding and Verbalizes understanding.         Patient provided with vocal hygiene practice handout - is to adhere to over the next 8 weeks. Patient also provided with home handout of SOVT exercises to practice independently until next treatment session.       Plan  From a speech language pathology perspective, the patient would benefit from: Skilled Rehab Services  Planned therapy interventions and modalities include: Voice therapy.              Visit Frequency: 1 times Per Week for 8 Weeks.       This plan was discussed with Patient.   Discussion participants: Agreed Upon Plan of Care           The patient's spiritual, cultural, and educational needs were considered, and the patient is agreeable to the plan of care and goals.     Goals:   Active       Long Term Goals       1. Patient will demonstrate improved vocal quality characterized by reduced strain, breathiness, and hoarseness across functional speaking tasks with use of efficient vocal technique in structured and spontaneous contexts.       Start:  07/02/25    Expected End:  08/27/25            2. Patient will independently implement vocal hygiene strategies and voice conservation techniques during daily activities to support vocal health, as evidenced by self-report and clinician observation.       Start:  07/02/25    Expected End:   08/27/25               Short Term Goals       1. Patient will demonstrate correct production of semi-occluded vocal tract (SOVT) exercises (e.g., straw phonation, lip trills) with reduced supraglottic tension in 8/10 trials during therapy sessions.       Start:  07/02/25    Expected End:  08/27/25            2. Patient will verbalize understanding of vocal hygiene and reflux precautions and identify at least 4 daily strategies (e.g., increased hydration, avoiding menthol/caffeine, use of humidifier) with 90% accuracy.       Start:  07/02/25    Expected End:  08/27/25            3. Patient will reduce perceived vocal effort during structured vocal tasks (e.g., sustained vowels, short phrases) as measured by patient self-rating and clinician observation in 4 out of 5 opportunities.       Start:  07/02/25    Expected End:  08/27/25            4. Patient will produce functional speech tasks (e.g., oral reading, phone script practice) with decreased signs of vocal strain and improved breath support in 3 out of 5 attempts with minimal cues.       Start:  07/02/25    Expected End:  08/27/25                KWAKU Diaz         Current Participants as of 7/2/2025    Name Type Comments Contact Info    Alvin Jenkins MD Referring Provider  [phone]    Signature pending    KWAKU Diaz Speech Language Pathologist      Electronically signed by KWAKU Diaz at 7/2/2025 1045 CDT            Sincerely,      KWAKU Diaz  [facility]                                                            Dear KWAKU Diaz,    RE: Ms. Lina Ventura, MRN: [id number]    I certify that I have reviewed the attached plan of care and agree to the details within.        ___________________________  ___________________________  Provider Printed Name   Provider Signed Name      ___________________________  Date and Time

## 2025-07-02 PROBLEM — R49.0 HOARSE VOICE QUALITY: Status: ACTIVE | Noted: 2025-07-02

## 2025-07-02 PROBLEM — J38.1 VOCAL CORD POLYPS: Status: ACTIVE | Noted: 2025-07-02

## 2025-07-02 NOTE — PROGRESS NOTES
"  Outpatient Rehab    Speech-Language Pathology Evaluation (only)    Patient Name: Lina Ventura  MRN: [id number]  YOB: 1950  Encounter Date: 7/1/2025    Therapy Diagnosis:   Encounter Diagnoses   Name Primary?    Hoarse voice quality Yes    Vocal cord polyps      Physician: Alvin Jenkins MD    Physician Orders: Eval and Treat  Medical Diagnosis: Hoarse  Oral phase dysphagia  Vocal cord polyps  Surgical Diagnosis: Not applicable for this Episode   Surgical Date: Not applicable for this Episode  Days Since Last Surgery: Not applicable for this Episode    Visit # / Visits Authorized: 1 / 1   Insurance Authorization Period: 5/14/2025 to 5/14/2026  Date of Evaluation: 7/1/2025      Time In: 1415   Time Out: 1500  Total Time (in minutes): 45   Total Billable Time (in minutes): 45      Subjective   History of Present Illness  Lina is a 74 y.o. female who reports to Speech-Language Pathology with a chief concern of Patient presents with persistent hoarseness and vocal fatigue. ENT evaluation revealed vocal cord polyps. Patient reports decreased vocal quality, vocal strain with prolonged speaking, and reduced ability to project voice, impacting daily communication demands..     The patient reports a medical diagnosis of Hoarse (R49.0), Oral phase dysphagia (R13.11), Vocal cord polyps (J38.1).    Diagnostic tests related to this condition: Laryngoscopy.   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  Mobility normal  Subglottis: normal  Hypopharynx  Piriform sinus: normal  Postcricoid region: normal; edema"    Patient presents in clinic breathing with Room air.         " "    Prior Level of Function: Independent, no vocal fatiuge or difficulty sustaining adequate vocal quality to meet conversational needs throughout the day  Current Level of Function: Independent, however, patient has persistent hoarseness and vocal fatigue, decreased vocal quality, vocal strain with prolonged speaking, and reduced ability to project voice, impacting daily communication demands. She expresses frustration with current vocal quality and how it has affected her ability to participate within her social ADL's (talking to friends on the phone, caring for grandchildren, etc.)    Lina is a 74 y.o. female who presents to [facility] with a long-standing history of dysphonia characterized by hoarseness, vocal fatigue, and reduced vocal endurance. She reports that symptoms began approximately 5-6 years ago with intermittent raspiness, lower pitch, and fluctuating voice quality throughout the day. However, as of January/February 2025, symptoms have become more persistent and now affect her voice throughout the entire day, worsening with vocal use. ENT evaluation identified vocal nodules (confirmed on laryngoscopy in May) and suspected laryngopharyngeal reflux (LPR) as a contributing factor. Patient restarted reflux medication in March, having previously been on similar treatment 5-6 years ago. Despite this, she reports no significant improvement in symptoms and perceives worsening voice quality. She describes increasing dryness, particularly at night, and a sensation of needing to "pull from the center of her chest" to initiate voice. She attempts to conserve her voice by limiting phone calls to the morning and reports vocal strain often leads to fatigue and occasional headaches. Patient denies formal vocal hygiene education and currently uses saltwater gargles as a self-management strategy. She reports occasional sensation of food "getting stuck" in her throat and is scheduled to follow up " with a gastroenterologist on the 16th. Past medical history includes seasonal allergies, previously severe coughing episodes, nasal polyps, and a deviated septum. Currently, coughing has decreased with reflux treatment. 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 She is frequently with her grandchildren and her two adult sons live with her. She reports that her vocal difficulties negatively impact her participation in these roles and her overall quality of life.     Current Speech Symptoms  Patient reports: Breathiness, Hoarseness, and Vocal Quality Changes         Voice Problem and History  Voice Problem Impact: Voice problem impacts her ability to activly participate in meaningful conversations throughout her day  Patient's Voice Problem Description: Breathy, Hoarse, Strained, Voice breaks/cracks, Pitch instability  Voice Problem Consistency: Constant, Worse with prolonged use     Voice-Relevant Medical History: Gastroesophageal reflux     Previous Voice Therapy: Medication, Limit voice use  Use of Voice: Frequent telephone use, Speaking in groups  Voice Hygiene Factors: Dehydration            Treatment History       No: Previously Received Treatments        No: Currently Receiving Treatments               Living Arrangements  Living Situation  Housing: Home independently  Living Arrangements: Children  Support Systems: Children, [employer]/kimber[employer] community         Employment  Patient does not report that: Does the patient's condition impact their ability to work?  Employment Status: Retired          Past Medical History/Physical Systems Review:   Lina Ventura  has a past medical history of Cholelithiasis.    Lina Ventura  has a past surgical history that includes Cholecystectomy (04/24/2017).    Lina has a current medication list which includes the following prescription(s): amlodipine, cholecalciferol (vitamin d3), lisinopril-hydrochlorothiazide,  loratadine, magnesium, pantoprazole, and potassium.    Review of patient's allergies indicates:   Allergen Reactions    Bactrim [sulfamethoxazole-trimethoprim] Palpitations and Other (See Comments)     Chest pain        Objective   Oral Peripheral Exam  Dentition and Oral Hygiene      Oral hygiene is Good.       Labial Exam  Labial Symmetry is Normal.        Tone is Normal. Coordination is Intact.     Seal is Normal.       Lingual Exam      Symmetry-CN XII-is Normal. Tone is Normal. Speed is Intact. Coordination is Intact.            Jaw Exam      Strength-CN V-is Normal. Tone is Normal.   Jaw Locking: No        Communication Modes and Devices     Patient Expressive Communication Modes: Verbal  Verbalizations: Phrases/sentences    Current Receptive Communication Modes: Auditory  Caregiver/Familiar Person Required to Support Communication: No            Vocal Characteristics  Activity: Phonation  Phonation Quality Characteristics: Hoarse, Strained  Phonation Loudness: Normal     Activity: Oral Reading  Oral Reading Quality Characteristics: Strained, Hoarse, Breathy, Pitch breaks  Oral Reading Loudness: Normal     Activity: Conversation  Conversation Quality Characteristics: Hoarse, Strained  Conversation Loudness: Normal    Volition  Volitional Cough: Normal   Volitional Swallow: Present      Motor Speech  Oral Agility  Oral Diadochokinesis     Speech Rate  Speech Rate: Normal       Intelligibility and Articulation  Intelligibility  Intact: Sentence-Level and Conversation-Level      Articulation  Speech Articulation: Normal        Motor Speech Impairment Type  Apraxia  Apraxia Type: None       Dysarthria  Dysarthria Type: None               Time Entry(in minutes):  Behavioral And Qualitative Voice/Resonance Analysis Time Entry: 30  Speech Treatment (Individual) Time Entry: 15    Assessment & Plan   Assessment   Agabita presents with symptoms that are Stable.  Will Comorbidities Impact Care: No       Diagnosis and  Impressions: Patient presents with chronic dysphonia characterized by hoarseness, strain, breathiness, and vocal fatigue, likely multifactorial in nature. Contributing factors appear to include underlying laryngopharyngeal reflux, vocal nodules, inefficient vocal habits, and possible compensatory hyperfunction. During evaluation, voice quality was noted to be hoarse, strained, breathy, and intermittently strangled, particularly during connected speech and oral reading tasks. Patient reports vocal symptoms are worsening despite recent initiation of reflux management, and describes significant impact on daily communication and participation in social roles. Patient demonstrated stimulability for semi-occluded vocal tract (SOVT) exercises, indicating potential for improved vocal efficiency with direct voice therapy. Vocal hygiene education was provided; patient demonstrated good insight and commitment to behavior change, including eliminating menthol cough drops, reducing caffeine intake, increasing water consumption, and initiating use of a humidifier. 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 The patient's learning style includes Demonstration, Listening, and Pictures/video. The patient Demonstrates understanding and Verbalizes understanding.         Patient provided with vocal hygiene practice handout - is to adhere to over the next 8 weeks. Patient also provided with home  handout of SOVT exercises to practice independently until next treatment session.       Plan  From a speech language pathology perspective, the patient would benefit from: Skilled Rehab Services  Planned therapy interventions and modalities include: Voice therapy.              Visit Frequency: 1 times Per Week for 8 Weeks.       This plan was discussed with Patient.   Discussion participants: Agreed Upon Plan of Care           The patient's spiritual, cultural, and educational needs were considered, and the patient is agreeable to the plan of care and goals.     Goals:   Active       Long Term Goals       1. Patient will demonstrate improved vocal quality characterized by reduced strain, breathiness, and hoarseness across functional speaking tasks with use of efficient vocal technique in structured and spontaneous contexts.       Start:  07/02/25    Expected End:  08/27/25            2. Patient will independently implement vocal hygiene strategies and voice conservation techniques during daily activities to support vocal health, as evidenced by self-report and clinician observation.       Start:  07/02/25    Expected End:  08/27/25               Short Term Goals       1. Patient will demonstrate correct production of semi-occluded vocal tract (SOVT) exercises (e.g., straw phonation, lip trills) with reduced supraglottic tension in 8/10 trials during therapy sessions.       Start:  07/02/25    Expected End:  08/27/25            2. Patient will verbalize understanding of vocal hygiene and reflux precautions and identify at least 4 daily strategies (e.g., increased hydration, avoiding menthol/caffeine, use of humidifier) with 90% accuracy.       Start:  07/02/25    Expected End:  08/27/25            3. Patient will reduce perceived vocal effort during structured vocal tasks (e.g., sustained vowels, short phrases) as measured by patient self-rating and clinician observation in 4 out of 5 opportunities.       Start:   07/02/25    Expected End:  08/27/25            4. Patient will produce functional speech tasks (e.g., oral reading, phone script practice) with decreased signs of vocal strain and improved breath support in 3 out of 5 attempts with minimal cues.       Start:  07/02/25    Expected End:  08/27/25                Liseth Jackson CCC-SLP

## 2025-07-07 ENCOUNTER — CLINICAL SUPPORT (OUTPATIENT)
Dept: REHABILITATION | Facility: HOSPITAL | Age: 75
End: 2025-07-07
Payer: MEDICARE

## 2025-07-07 DIAGNOSIS — J38.1 VOCAL CORD POLYPS: ICD-10-CM

## 2025-07-07 DIAGNOSIS — R49.0 HOARSE VOICE QUALITY: Primary | ICD-10-CM

## 2025-07-07 PROCEDURE — 92507 TX SP LANG VOICE COMM INDIV: CPT

## 2025-07-08 NOTE — PROGRESS NOTES
Outpatient Rehab    Speech-Language Pathology Visit    Patient Name: Lina Ventura  MRN: [id number]  YOB: 1950  Encounter Date: 7/7/2025    Therapy Diagnosis:   Encounter Diagnoses   Name Primary?    Hoarse voice quality Yes    Vocal cord polyps      Physician: Alvin Jenkins MD    Physician Orders: Eval and Treat  Medical Diagnosis: Hoarse  Oral phase dysphagia  Vocal cord polyps  Surgical Diagnosis: Not applicable for this Episode   Surgical Date: Not applicable for this Episode  Days Since Last Surgery: Not applicable for this Episode    Visit # / Visits Authorized: 1 / 10   Insurance Authorization Period: 7/1/2025 to 12/31/2025  Date of Evaluation: 7/1/2025   Plan of Care Certification: 7/2/2025 to 8/27/2025      Time In: 1330   Time Out: 1415  Total Time (in minutes): 45   Total Billable Time (in minutes): 45         Subjective   Patient reports no noticeable changes in vocal quality since initial evaluation. She states she is implementing several vocal hygiene strategies, including voice conservation, increased water intake, reducing caffeine consumption, and discontinuing use of menthol cough drops. She has not yet obtained a humidifier. Patient also reports practicing cup bubble exercises at home, incorporating both voicing and single-word productions..  Pain reported as 0/10.        Objective          Patient presented with persistent hoarseness and vocal strain. Perceptual voice assessment revealed a breathy, rough, and strained vocal quality with reduced pitch and volume control. Maximum phonation time was reduced. Patient demonstrated effortful voicing during structured tasks and spontaneous speech. Tension was noted in the neck and laryngeal area. Patient participated in semi-occluded vocal tract (SOVT) exercises, including cup bubble phonation, with verbal and visual cueing. Vocal hygiene strategies were reviewed and reinforced.    Treatment  Speech  Activity 1: Vocal Hygene  Education  Activity 2: Semi Occluded Vocal Tract exercises  Activity 3: Self assessment  Activity 4: Diaphramatic Breathing  Activity 5: Stretching/relaxation for neck, jaw, and tongue    Time Entry(in minutes):  Speech Treatment (Individual) Time Entry: 45    Assessment & Plan   Assessment  Patient demonstrated correct production of semi-occluded vocal tract (SOVT) exercises (e.g., straw phonation, lip trills) with reduced supraglottic tension in 5/10 trials during therapy sessions. Patient verbalized understanding of vocal hygiene  and identified at least 4 daily strategies (e.g., increased hydration, avoiding menthol/caffeine, use of humidifier, voice preservation) with 80% accuracy. Patient reduced perceived vocal effort during structured vocal tasks (e.g., sustained vowels, short phrases) as measured by patient self-rating and clinician observation in 3 out of 5 opportunities. Patient produced functional speech tasks (e.g., answering y/n questions) with decreased signs of vocal strain and improved breath support in 2 out of 5 attempts with moderate to max tactile cues (e.g., SOVT supports).  Evaluation/Treatment Tolerance: Patient tolerated treatment well    The patient will continue to benefit from skilled outpatient speech therapy in order to address the deficits listed in the problem list on the initial evaluation, provide patient and family education, and maximize the patients level of independence in the home and community environments.     The patient's spiritual, cultural, and educational needs were considered, and the patient is agreeable to the plan of care and goals.     Education  Education was done with Patient. The patient's learning style includes Demonstration, Listening, and Pictures/video. The patient Demonstrates understanding and Verbalizes understanding.         Patient provided with home exercise program which included step by step instructions of daily voice practice (diaphragmatic  breathing, stretching, cup bubbles, varying pitch, word and short phrase production with desired vocal quality). Patient instructed to keep voice journal to log self assessment while exercising at home.         Plan  Continue with current plan of care          Goals:   Active       Long Term Goals       1. Patient will demonstrate improved vocal quality characterized by reduced strain, breathiness, and hoarseness across functional speaking tasks with use of efficient vocal technique in structured and spontaneous contexts. (Progressing)       Start:  07/02/25    Expected End:  08/27/25            2. Patient will independently implement vocal hygiene strategies and voice conservation techniques during daily activities to support vocal health, as evidenced by self-report and clinician observation. (Progressing)       Start:  07/02/25    Expected End:  08/27/25               Short Term Goals       1. Patient will demonstrate correct production of semi-occluded vocal tract (SOVT) exercises (e.g., straw phonation, lip trills) with reduced supraglottic tension in 8/10 trials during therapy sessions. (Progressing)       Start:  07/02/25    Expected End:  08/27/25            2. Patient will verbalize understanding of vocal hygiene and reflux precautions and identify at least 4 daily strategies (e.g., increased hydration, avoiding menthol/caffeine, use of humidifier) with 90% accuracy. (Progressing)       Start:  07/02/25    Expected End:  08/27/25            3. Patient will reduce perceived vocal effort during structured vocal tasks (e.g., sustained vowels, short phrases) as measured by patient self-rating and clinician observation in 4 out of 5 opportunities. (Progressing)       Start:  07/02/25    Expected End:  08/27/25            4. Patient will produce functional speech tasks (e.g., oral reading, phone script practice) with decreased signs of vocal strain and improved breath support in 3 out of 5 attempts with minimal  cues. (Progressing)       Start:  07/02/25    Expected End:  08/27/25                Liseth Jackson CCC-SLP

## 2025-07-14 DIAGNOSIS — I10 ESSENTIAL HYPERTENSION: ICD-10-CM

## 2025-07-14 RX ORDER — LISINOPRIL AND HYDROCHLOROTHIAZIDE 12.5; 2 MG/1; MG/1
1 TABLET ORAL DAILY
Qty: 30 TABLET | Refills: 0 | Status: SHIPPED | OUTPATIENT
Start: 2025-07-14 | End: 2026-07-14

## 2025-07-14 NOTE — TELEPHONE ENCOUNTER
Booked for next week, refill set up for local pharmacy since she is out, can get mail order sent @ visit

## 2025-07-14 NOTE — TELEPHONE ENCOUNTER
----- Message from Blanca sent at 7/14/2025  7:40 AM CDT -----  -7/12-8:30 am-  pt needs refill on lisinopril   [facility]   [phone]

## 2025-07-14 NOTE — TELEPHONE ENCOUNTER
----- Message from Romelia sent at 7/14/2025  8:04 AM CDT -----  Patient calling in regarding to needing a prescription on her lisinopril.   [facility] [address]  [phone]

## 2025-07-17 DIAGNOSIS — K21.9 GERD (GASTROESOPHAGEAL REFLUX DISEASE): Primary | ICD-10-CM

## 2025-07-17 DIAGNOSIS — R49.0 HOARSENESS: ICD-10-CM

## 2025-07-22 ENCOUNTER — OFFICE VISIT (OUTPATIENT)
Dept: FAMILY MEDICINE | Facility: CLINIC | Age: 75
End: 2025-07-22
Payer: MEDICARE

## 2025-07-22 VITALS — HEIGHT: 59 IN | HEART RATE: 83 BPM | OXYGEN SATURATION: 98 % | BODY MASS INDEX: 27.25 KG/M2 | WEIGHT: 135.19 LBS

## 2025-07-22 DIAGNOSIS — K21.00 GASTROESOPHAGEAL REFLUX DISEASE WITH ESOPHAGITIS WITHOUT HEMORRHAGE: ICD-10-CM

## 2025-07-22 DIAGNOSIS — Z00.00 ANNUAL PHYSICAL EXAM: ICD-10-CM

## 2025-07-22 DIAGNOSIS — I10 ESSENTIAL HYPERTENSION: Primary | ICD-10-CM

## 2025-07-22 DIAGNOSIS — R49.0 HOARSENESS: ICD-10-CM

## 2025-07-22 PROCEDURE — 99214 OFFICE O/P EST MOD 30 MIN: CPT | Mod: S$GLB,,, | Performed by: PHYSICIAN ASSISTANT

## 2025-07-22 RX ORDER — LISINOPRIL AND HYDROCHLOROTHIAZIDE 12.5; 2 MG/1; MG/1
1 TABLET ORAL DAILY
Qty: 90 TABLET | Refills: 3 | Status: SHIPPED | OUTPATIENT
Start: 2025-07-22 | End: 2026-07-22

## 2025-07-22 RX ORDER — FAMOTIDINE 40 MG/1
40 TABLET, FILM COATED ORAL NIGHTLY
COMMUNITY
Start: 2025-07-16

## 2025-07-22 NOTE — PROGRESS NOTES
SUBJECTIVE:    Patient ID: Lina Ventura is a 74 y.o. female.    Chief Complaint: Annual Exam (Patient refused a bone density scan.)    History of Present Illness    CHIEF COMPLAINT:  Lina presents today for persistent hoarseness of voice.    HISTORY OF PRESENT ILLNESS:  She reports persistent hoarseness affecting her voice throughout the day, with symptoms worsening in the afternoons. Her voice condition has gradually deteriorated over the past 5-6 years. Speaking causes significant fatigue and chest strain, which she describes as exhausting and makes her feel extremely tired. She finds the condition embarrassing and it impacts her daily communication. She recently saw an ENT (Dr. Khalil) who identified red vocal cords with nodules and nasal polyps, with a potential association to gastric reflux. She consulted a speech pathologist but canceled follow-up visits, expressing skepticism about potential treatment effectiveness. She denies improvement despite medical interventions and remains concerned about the progressive nature of her voice issues.    GERD:  She has a history of hiatal hernia and previous endoscopy in 2020 revealing a duodenal ulcer. She experiences nighttime reflux approximately once monthly, characterized by a sensation of regurgitation. She reports difficulty swallowing during her previous endoscopy. She has implemented dietary modifications, avoiding barbecue and fried foods due to discomfort and pain. Reflux symptoms were particularly severe during previous gallbladder issues. She actively manages symptoms through dietary restrictions, avoiding trigger foods that exacerbate gastric discomfort.    FAMILY HISTORY:  Family history of esophageal cancer in an uncle who was a non-smoker.    MEDICATIONS:  Currently taking Lisinopril HCTZ for blood pressure management. Starting Pantoprazole 40 mg twice daily (morning and evening) and Famotidine at nighttime. She reports dry mouth as a side effect  of medications, requiring frequent water intake during the night. She takes calcium plus vitamin D supplements (Caltrate).    OSTEOPENIA:  She is aware of her osteopenia diagnosis and is proactively managing bone health through light-weight bearing exercise and sun exposure. She is actively engaged in preventive health strategies and declines additional bone density testing, believing her current lifestyle interventions are sufficient.      ROS:  General: -fever, -chills, +fatigue, -weight gain, -weight loss  Eyes: -vision changes, -redness, -discharge  ENT: -ear pain, -nasal congestion, -sore throat, +hoarseness, +speech difficulty, +dry mouth  Cardiovascular: -chest pain, -palpitations, -lower extremity edema  Respiratory: -cough, -shortness of breath  Gastrointestinal: -abdominal pain, -nausea, -vomiting, -diarrhea, -constipation, -blood in stool, +indigestion  Genitourinary: -dysuria, -hematuria, -frequency  Musculoskeletal: -joint pain, -muscle pain  Skin: -rash, -lesion  Neurological: -headache, -dizziness, -numbness, -tingling  Psychiatric: -anxiety, -depression, -sleep difficulty  Allergic: +seasonal allergies         No visits with results within 6 Month(s) from this visit.   Latest known visit with results is:   Office Visit on 06/13/2024   Component Date Value Ref Range Status    WBC 06/13/2024 5.1  3.8 - 10.8 Thousand/uL Final    RBC 06/13/2024 5.20 (H)  3.80 - 5.10 Million/uL Final    Hemoglobin 06/13/2024 14.7  11.7 - 15.5 g/dL Final    Hematocrit 06/13/2024 46.4 (H)  35.0 - 45.0 % Final    MCV 06/13/2024 89.2  80.0 - 100.0 fL Final    MCH 06/13/2024 28.3  27.0 - 33.0 pg Final    MCHC 06/13/2024 31.7 (L)  32.0 - 36.0 g/dL Final    RDW 06/13/2024 13.9  11.0 - 15.0 % Final    Platelets 06/13/2024 257  140 - 400 Thousand/uL Final    MPV 06/13/2024 11.0  7.5 - 12.5 fL Final    Neutrophils, Abs 06/13/2024 3,075  1,500 - 7,800 cells/uL Final    Lymph # 06/13/2024 1,403  850 - 3,900 cells/uL Final    Mono #  06/13/2024 510  200 - 950 cells/uL Final    Eos # 06/13/2024 92  15 - 500 cells/uL Final    Baso # 06/13/2024 20  0 - 200 cells/uL Final    Neutrophils Relative 06/13/2024 60.3  % Final    Lymph % 06/13/2024 27.5  % Final    Mono % 06/13/2024 10.0  % Final    Eosinophil % 06/13/2024 1.8  % Final    Basophil % 06/13/2024 0.4  % Final    Glucose 06/13/2024 115 (H)  65 - 99 mg/dL Final    BUN 06/13/2024 19  7 - 25 mg/dL Final    Creatinine 06/13/2024 0.85  0.60 - 1.00 mg/dL Final    eGFR 06/13/2024 72  > OR = 60 mL/min/1.73m2 Final    BUN/Creatinine Ratio 06/13/2024 SEE NOTE:  6 - 22 (calc) Final    Sodium 06/13/2024 137  135 - 146 mmol/L Final    Potassium 06/13/2024 4.2  3.5 - 5.3 mmol/L Final    Chloride 06/13/2024 99  98 - 110 mmol/L Final    CO2 06/13/2024 29  20 - 32 mmol/L Final    Calcium 06/13/2024 9.0  8.6 - 10.4 mg/dL Final    Total Protein 06/13/2024 7.4  6.1 - 8.1 g/dL Final    Albumin 06/13/2024 4.0  3.6 - 5.1 g/dL Final    Globulin, Total 06/13/2024 3.4  1.9 - 3.7 g/dL (calc) Final    Albumin/Globulin Ratio 06/13/2024 1.2  1.0 - 2.5 (calc) Final    Total Bilirubin 06/13/2024 0.7  0.2 - 1.2 mg/dL Final    Alkaline Phosphatase 06/13/2024 87  37 - 153 U/L Final    AST 06/13/2024 16  10 - 35 U/L Final    ALT 06/13/2024 13  6 - 29 U/L Final    Cholesterol 06/13/2024 197  <200 mg/dL Final    HDL 06/13/2024 60  > OR = 50 mg/dL Final    Triglycerides 06/13/2024 111  <150 mg/dL Final    LDL Cholesterol 06/13/2024 115 (H)  mg/dL (calc) Final    HDL/Cholesterol Ratio 06/13/2024 3.3  <5.0 (calc) Final    Non HDL Chol. (LDL+VLDL) 06/13/2024 137 (H)  <130 mg/dL (calc) Final    TSH 06/13/2024 3.23  0.40 - 4.50 mIU/L Final    Magnesium 06/13/2024 2.2  1.5 - 2.5 mg/dL Final       Past Medical History:   Diagnosis Date    Cholelithiasis      Past Surgical History:   Procedure Laterality Date    CHOLECYSTECTOMY  04/24/2017    Laparoscopic      Family History   Problem Relation Name Age of Onset    Melanoma Brother       "Breast cancer Maternal Aunt          In her 30's       Marital Status: Single  Alcohol History:  reports no history of alcohol use.  Tobacco History:  reports that she has never smoked. She has never used smokeless tobacco.  Drug History:  reports no history of drug use.    Review of patient's allergies indicates:   Allergen Reactions    Bactrim [sulfamethoxazole-trimethoprim] Palpitations and Other (See Comments)     Chest pain     Current Medications[1]    Objective:      Vitals:    07/22/25 1529   BP: (P) 112/72   Pulse: 83   SpO2: 98%   Weight: 61.3 kg (135 lb 3.2 oz)   Height: 4' 10.5" (1.486 m)     Physical Exam    General: No acute distress. Well-developed. Well-nourished.  Eyes: EOMI. Sclerae anicteric.  HENT: Normocephalic. Atraumatic. Nares patent. Moist oral mucosa.  Ears: Bilateral TMs clear. Bilateral EACs clear.  Cardiovascular: Regular rate. Regular rhythm. No murmurs. No rubs. No gallops. Normal S1, S2.  Respiratory: Normal respiratory effort. Clear to auscultation bilaterally. No rales. No rhonchi. No wheezing.  Abdomen: Soft. Non-tender. Non-distended. Normoactive bowel sounds.  Musculoskeletal: No  obvious deformity.  Extremities: No lower extremity edema.  Neurological: Alert & oriented x3. No slurred speech. Normal gait.  Psychiatric: Normal mood. Normal affect. Good insight. Good judgment.  Skin: Warm. Dry. No rash.         Assessment:       Assessment & Plan    - Suspect more significant esophagitis with acid reflux causing laryngeal symptoms.  - Agreed with ENT's assessment of gastric reflux as underlying cause of vocal cord issues.  - Consider more aggressive treatment if esophagram shows erosive changes.  - Acknowledged risk of esophageal cancer due to significant GERD, may warrant endoscopic visualization.  - Evaluated effectiveness of current acid suppression therapy before considering newer medication options (e.g. Voquezna).  - Deferred DEXA scan due to active lifestyle and current " management of osteopenia.    VOCAL CORD NODULES AND DYSPHONIA:  - Monitored the patient's permanent hoarseness and worsening voice quality throughout the day, with voice being better in the morning and deteriorating by evening.  - ENT exam revealed erythema with nodules on vocal cords, indicating potential irritation or inflammation.  - Referred the patient to speech pathologist for voice therapy exercises, despite patient's skepticism about its effectiveness.    NASAL POLYPS:  - Confirmed presence of nasal polyps during ENT exam, which the patient reports occur concurrently with vocal cord nodules.    GASTROESOPHAGEAL REFLUX DISEASE (GERD):  - Assessed that vocal cord nodules and dysphonia may be related to gastric reflux, based on discussion with ENT.  - Initiated pantoprazole (Protonix) 1 tablet twice daily and prescribed famotidine (Pepcid) 1 tablet at nighttime.  - Instructed the patient to continue sleeping on an incline as recommended by Dr. Ulloa.  - Noted pending upper GI fluoroscopy (barium swallow test) ordered by Dr. Ulloa to evaluate reflux and esophageal condition.  - Will monitor scheduling and follow up if not scheduled soon.  - Advised the patient to contact office about results.    HIATAL HERNIA:  - Monitored the patient's small hiatal hernia noted during previous exam, which could predispose to reflux and esophagitis.  - This condition is potentially contributing to the patient's reflux symptoms and will be further evaluated during the upcoming upper GI fluoroscopy.    DYSPHAGIA:  - Monitored the patient's reported difficulty swallowing, potentially related to esophageal condition.  - Previous exam findings noted dysphagia, which will be evaluated during the upcoming upper GI fluoroscopy.    OSTEOPENIA:  - Instructed the patient to maintain current calcium and vitamin D supplementation and continue weight-bearing exercises for bone health.  - Noted the patient's active participation in these  activities and documented patient's belief that they do not have osteoporosis due to active lifestyle.  - Agabita declines bone density test, believing it unnecessary due to their bone health maintenance regimen.    HYPERTENSION:  - Continued lisinopril HCTZ for blood pressure management.  - Will send prescription to [facility] for a full year supply.  - Evaluation of heart and lungs showed normal sounds with no wheezing, crackles, or rhonchi noted.    HISTORY OF DUODENAL ULCER:  - Monitored the patient's history of duodenal ulcer noted during previous exam, potentially contributing to reflux symptoms.  - This condition will be further evaluated during the upcoming upper GI fluoroscopy.    FAMILY HISTORY OF ESOPHAGEAL CANCER:  - Monitored and discussed the patient's family history of esophageal cancer in a non-smoking uncle, emphasizing increased concern for potential risk and need for further evaluation.    FOLLOW-UP:  - Ordered annual labs, follow up for labs any morning in the next few weeks.       Plan:       Essential hypertension  Comments:  continue as is.  Orders:  -     lisinopriL-hydrochlorothiazide (PRINZIDE,ZESTORETIC) 20-12.5 mg per tablet; Take 1 tablet by mouth once daily.  Dispense: 90 tablet; Refill: 3    Annual physical exam    Gastroesophageal reflux disease with esophagitis without hemorrhage    Hoarseness      Follow up in about 1 year (around 7/22/2026).    This note was generated with the assistance of ambient listening technology. Verbal consent was obtained by the patient and accompanying visitor(s) for the recording of patient appointment to facilitate this note. I attest to having reviewed and edited the generated note for accuracy, though some syntax or spelling errors may persist. Please contact the author of this note for any clarification.          7/22/2025 Tylor Brewer PA-C           [1]   Current Outpatient Medications:     cholecalciferol, vitamin D3, 4,000 unit Cap,  Vitamin D3 4,000 unit capsule  Take 1 capsule every day by oral route., Disp: , Rfl:     famotidine (PEPCID) 40 MG tablet, Take 40 mg by mouth every evening., Disp: , Rfl:     loratadine (CLARITIN) 10 mg tablet, TAKE ONE TABLET BY MOUTH ONCE DAILY FOR ALLERGIC RHINITIS, Disp: , Rfl:     magnesium 30 mg Tab, Take by mouth Daily., Disp: , Rfl:     pantoprazole (PROTONIX) 40 MG tablet, Take 40 mg by mouth daily as needed., Disp: , Rfl:     potassium 99 mg Tab, Take by mouth once., Disp: , Rfl:     lisinopriL-hydrochlorothiazide (PRINZIDE,ZESTORETIC) 20-12.5 mg per tablet, Take 1 tablet by mouth once daily., Disp: 90 tablet, Rfl: 3

## 2025-08-12 ENCOUNTER — HOSPITAL ENCOUNTER (OUTPATIENT)
Dept: RADIOLOGY | Facility: HOSPITAL | Age: 75
Discharge: HOME | End: 2025-08-12
Attending: INTERNAL MEDICINE
Payer: MEDICARE

## 2025-08-12 DIAGNOSIS — R49.0 HOARSENESS: ICD-10-CM

## 2025-08-12 DIAGNOSIS — K21.9 GERD (GASTROESOPHAGEAL REFLUX DISEASE): ICD-10-CM

## 2025-08-12 PROCEDURE — 74220 X-RAY XM ESOPHAGUS 1CNTRST: CPT | Mod: TC

## 2025-08-12 PROCEDURE — 74220 X-RAY XM ESOPHAGUS 1CNTRST: CPT | Mod: 26,,, | Performed by: RADIOLOGY
